# Patient Record
Sex: MALE | Race: WHITE | NOT HISPANIC OR LATINO | Employment: OTHER | URBAN - METROPOLITAN AREA
[De-identification: names, ages, dates, MRNs, and addresses within clinical notes are randomized per-mention and may not be internally consistent; named-entity substitution may affect disease eponyms.]

---

## 2019-03-06 ENCOUNTER — HOSPITAL ENCOUNTER (EMERGENCY)
Facility: HOSPITAL | Age: 59
Discharge: HOME | End: 2019-03-06
Attending: EMERGENCY MEDICINE
Payer: OTHER MISCELLANEOUS

## 2019-03-06 VITALS
HEART RATE: 84 BPM | SYSTOLIC BLOOD PRESSURE: 136 MMHG | OXYGEN SATURATION: 96 % | WEIGHT: 214.31 LBS | DIASTOLIC BLOOD PRESSURE: 74 MMHG | TEMPERATURE: 98 F | RESPIRATION RATE: 18 BRPM
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
## 2019-03-06 DIAGNOSIS — S42.202A CLOSED FRACTURE OF PROXIMAL END OF LEFT HUMERUS, UNSPECIFIED FRACTURE MORPHOLOGY, INITIAL ENCOUNTER: Primary | ICD-10-CM

## 2019-03-06 PROCEDURE — 99283 EMERGENCY DEPT VISIT LOW MDM: CPT

## 2019-03-06 RX ORDER — HYDROCODONE BITARTRATE AND ACETAMINOPHEN 7.5; 325 MG/1; MG/1
1 TABLET ORAL EVERY 6 HOURS PRN
Qty: 12 TABLET | Refills: 0 | Status: SHIPPED | OUTPATIENT
Start: 2019-03-06 | End: 2019-05-23

## 2019-03-06 NOTE — ED PROVIDER NOTES
SCRIBE #1 NOTE: I, Yanique Bond, am scribing for, and in the presence of, Sloan Moran NP. I have scribed the entire note.       History     Chief Complaint   Patient presents with    Arm Injury     pt fell today and landed on left arm. Pt reports pain to left shoulder and upper arm. Hx of surgery on arm      Review of patient's allergies indicates:  No Known Allergies      History of Present Illness     HPI    3/6/2019, 4:39 PM  History obtained from the patient      History of Present Illness: Cruz Tran is a 59 y.o. male patient with no pertinent PMHx who presents to the Emergency Department for evaluation of upper L arm pain which onset suddenly after the pt tripped and landed on his arm. Symptoms are constant and moderate in severity. No mitigating or exacerbating factors reported. Associated sxs include L shoulder pain. Patient denies any fever, chills, N/V/D, back pain, neck pain, joint swelling, and all other sxs at this time. No prior tx reported. No further complaints or concerns at this time.         Arrival mode: Personal vehicle      PCP: Jovanny Barboza MD        Past Medical History:  Past Medical History:   Diagnosis Date    Hypertension        Past Surgical History:  Past Surgical History:   Procedure Laterality Date    arm surgery      HERNIA REPAIR         Family History:  Family history reviewed not relevant      Social History:  Social History     Tobacco Use    Smoking status: Current Every Day Smoker    Smokeless tobacco: Never Used   Substance and Sexual Activity    Alcohol use: Yes     Frequency: Never    Drug use: Unknown     Sexual activity: Unknown         Review of Systems     Review of Systems   Constitutional: Negative for chills and fever.   HENT: Negative for sore throat.    Respiratory: Negative for shortness of breath.    Cardiovascular: Negative for chest pain.   Gastrointestinal: Negative for diarrhea, nausea and vomiting.   Genitourinary: Negative for  dysuria.   Musculoskeletal: Negative for back pain, joint swelling and neck pain.        (+) L shoulder pain   (+) L upper arm pain    Skin: Negative for rash.   Neurological: Negative for weakness.   Hematological: Does not bruise/bleed easily.   All other systems reviewed and are negative.     Physical Exam     Initial Vitals [03/06/19 1618]   BP Pulse Resp Temp SpO2   136/74 84 18 98.1 °F (36.7 °C) 96 %      MAP       --          Physical Exam  Nursing Notes and Vital Signs Reviewed.  Constitutional: Patient is in no acute distress. Well-developed and well-nourished.  Head: Atraumatic. Normocephalic.  Eyes: PERRL. EOM intact. Conjunctivae are not pale. No scleral icterus.  ENT: Mucous membranes are moist. Oropharynx is clear and symmetric.    Neck: Supple. Full ROM. No lymphadenopathy.  Cardiovascular: Regular rate. Regular rhythm. No murmurs, rubs, or gallops. Distal pulses are 2+ and symmetric.  Pulmonary/Chest: No respiratory distress. Clear to auscultation bilaterally. No wheezing or rales.  Abdominal: Soft and non-distended.  There is no tenderness.  No rebound, guarding, or rigidity. Good bowel sounds.  Genitourinary: No CVA tenderness  Musculoskeletal: Moves all extremities. No obvious deformities. No edema. No calf tenderness.  LUE: has no evident deformity. Negative for swelling. L proximal upper extremity TTP. ROM is limited secondary to pain. Cap refill distally is <2 seconds. Radial pulses are equal and 2+ bilaterally. No motor deficit. No distal sensory deficit. NVI distally.   Skin: Warm and dry.  Neurological:  Alert, awake, and appropriate.  Normal speech.  No acute focal neurological deficits are appreciated.  Psychiatric: Normal affect. Good eye contact. Appropriate in content.     ED Course   Orthopedic Injury  Date/Time: 3/6/2019 4:46 PM  Performed by: Sloan Moran NP  Authorized by: Sloan Mroan NP     Injury:     Injury location:  Upper arm    Location details:  Left upper arm     Injury type:  Fracture      Pre-procedure assessment:     Neurovascular status: Neurovascularly intact      Distal perfusion: normal      Neurological function: normal      Range of motion: reduced      Local anesthesia used?: No      Patient sedated?: No        Selections made in this section will also lock the Injury type section above.:     Immobilization:  Sling    Complications: No      Specimens: No      Implants: No    Post-procedure assessment:     Neurovascular status: Neurovascularly intact      Distal perfusion: normal      Neurological function: normal      Range of motion: unchanged      Patient tolerance:  Patient tolerated the procedure well with no immediate complications      ED Vital Signs:  Vitals:    03/06/19 1618   BP: 136/74   Pulse: 84   Resp: 18   Temp: 98.1 °F (36.7 °C)   TempSrc: Oral   SpO2: 96%   Weight: 97.2 kg (214 lb 4.6 oz)         Imaging Results:  Imaging Results          X-Ray Shoulder 2 or More Views Left (In process)                X-Ray Humerus 2 View Left (Final result)  Result time 03/06/19 16:31:19    Final result by Myron Rubio III, MD (03/06/19 16:31:19)                 Impression:      New acute fracture of the proximal left humerus, as above.  Chronic healed internally fixated fracture of the mid humeral diaphysis.      Electronically signed by: Myron Rubio MD  Date:    03/06/2019  Time:    16:31             Narrative:    EXAMINATION:  XR HUMERUS 2 VIEW LEFT    CLINICAL HISTORY:  Unspecified fall, initial encounter    FINDINGS:  Comparison is made to 11/24/2009.  There is a chronic healed internally fixated fracture of the mid humeral diaphysis without evidence of hardware failure or other acute complication.  There is a new acute longitudinal to oblique fracture of the greater tuberosity extending into the superolateral humeral head and lateral humeral diaphysis.  There is a suspected impacted fracture of the humeral neck.  No other fracture is identified.  Joint  alignment is anatomic.                                      The Emergency Provider reviewed the vital signs and test results, which are outlined above.     ED Discussion     4:44 PM: Primary exam described above. Discussed with pt all pertinent ED information and results. Discussed pt plan of tx. Gave pt all f/u and return to the ED instructions. All questions and concerns were addressed at this time. Pt expresses understanding of information and instructions, and is comfortable with plan to discharge. Pt is stable for discharge.    I discussed with patient and/or family/caretaker that evaluation in the ED does not suggest any emergent or life threatening medical conditions requiring immediate intervention beyond what was provided in the ED, and I believe patient is safe for discharge.  Regardless, an unremarkable evaluation in the ED does not preclude the development or presence of a serious of life threatening condition. As such, patient was instructed to return immediately for any worsening or change in current symptoms.    Driving or other activities under influence of medications - Patient and/or family/caretaker was given a prescription for, or instructed to use a medicine that may impair ability to drive, operate machinery, or participate in other potentially dangerous activities.  Patient was instructed not to participate in these activities while under the influence of these medications.      ED Medication(s):  Medications - No data to display    Current Discharge Medication List      START taking these medications    Details   HYDROcodone-acetaminophen (NORCO) 7.5-325 mg per tablet Take 1 tablet by mouth every 6 (six) hours as needed for Pain.  Qty: 12 tablet, Refills: 0               Follow-up Information     [facility]. Schedule an appointment as soon as possible for a visit in 2 days.    Contact information:  [phone]                       Medical Decision Making:   Clinical Tests:    Radiological Study: Ordered and Reviewed             Scribe Attestation:   Scribe #1: I performed the above scribed service and the documentation accurately describes the services I performed. I attest to the accuracy of the note.     Attending:   Physician Attestation Statement for Scribe #1: I, Sloan Moran NP, personally performed the services described in this documentation, as scribed by Yanique Bond, in my presence, and it is both accurate and complete.           Clinical Impression       ICD-10-CM ICD-9-CM   1. Closed fracture of proximal end of left humerus, unspecified fracture morphology, initial encounter S42.202A 812.00   2. Fall W19.XXXA E888.9       Disposition:   Disposition: Discharged  Condition: Stable         Sloan Moran NP  03/06/19 4136

## 2019-03-08 ENCOUNTER — OFFICE VISIT (OUTPATIENT)
Dept: ORTHOPEDICS | Facility: CLINIC | Age: 59
End: 2019-03-08
Payer: OTHER MISCELLANEOUS

## 2019-03-08 VITALS
SYSTOLIC BLOOD PRESSURE: 127 MMHG | WEIGHT: 214 LBS | HEIGHT: 71 IN | DIASTOLIC BLOOD PRESSURE: 86 MMHG | BODY MASS INDEX: 29.96 KG/M2 | HEART RATE: 87 BPM

## 2019-03-08 DIAGNOSIS — S42.292A OTHER CLOSED DISPLACED FRACTURE OF PROXIMAL END OF LEFT HUMERUS, INITIAL ENCOUNTER: ICD-10-CM

## 2019-03-08 DIAGNOSIS — M25.512 ACUTE PAIN OF LEFT SHOULDER: ICD-10-CM

## 2019-03-08 DIAGNOSIS — R52 PAIN: Primary | ICD-10-CM

## 2019-03-08 DIAGNOSIS — S42.392S OTHER CLOSED FRACTURE OF SHAFT OF LEFT HUMERUS, SEQUELA: ICD-10-CM

## 2019-03-08 PROCEDURE — 99999 PR PBB SHADOW E&M-EST. PATIENT-LVL III: ICD-10-PCS | Mod: PBBFAC,,, | Performed by: ORTHOPAEDIC SURGERY

## 2019-03-08 PROCEDURE — 99204 PR OFFICE/OUTPT VISIT, NEW, LEVL IV, 45-59 MIN: ICD-10-PCS | Mod: S$GLB,,, | Performed by: ORTHOPAEDIC SURGERY

## 2019-03-08 PROCEDURE — 99999 PR PBB SHADOW E&M-EST. PATIENT-LVL III: CPT | Mod: PBBFAC,,, | Performed by: ORTHOPAEDIC SURGERY

## 2019-03-08 PROCEDURE — 99204 OFFICE O/P NEW MOD 45 MIN: CPT | Mod: S$GLB,,, | Performed by: ORTHOPAEDIC SURGERY

## 2019-03-08 NOTE — PROGRESS NOTES
Subjective:     Patient ID: Cruz Tran is a 59 y.o. male.    Chief Complaint: Pain of the Left Shoulder    He had a fall onto his left shoulder 2 days ago, impacted his left shoulder immediate pain. He had a prior left humerus fracture here that was fix, underwent open reduction internal fixation by a surgeon years ago.  No issues until his recent fall.  His pain is more proximal the shoulder that was before.  He is in a sling today. He works for he works for [employer].  This is a worker's Comp claim.      Shoulder Injury    The pain is present in the left shoulder. This is a new problem. The current episode started in the past 7 days (wednesday 3/6). There has been a history of trauma. The injury was the result of a pulling and falling (pt states he was pulling pallet eleno and he tripped over another pallet behind him and fell in a parking lot on his shoulder) action while at work. The problem occurs constantly. The problem has been gradually worsening. The quality of the pain is described as aching, sharp and shooting. The pain is at a severity of 8/10. Pertinent negatives include no fever or itching. The symptoms are aggravated by activity. He has tried oral narcotics for the symptoms. Physical therapy was not tried.      Past Medical History:   Diagnosis Date    Hypertension      Past Surgical History:   Procedure Laterality Date    arm surgery      HERNIA REPAIR       History reviewed. No pertinent family history.  Social History     Socioeconomic History    Marital status:      Spouse name: Not on file    Number of children: Not on file    Years of education: Not on file    Highest education level: Not on file   Social Needs    Financial resource strain: Not on file    Food insecurity - worry: Not on file    Food insecurity - inability: Not on file    Transportation needs - medical: Not on file    Transportation needs - non-medical: Not on file   Occupational History    Not on  file   Tobacco Use    Smoking status: Current Every Day Smoker    Smokeless tobacco: Never Used   Substance and Sexual Activity    Alcohol use: Yes     Frequency: Never    Drug use: Not on file    Sexual activity: Not on file   Other Topics Concern    Not on file   Social History Narrative    Not on file     Medication List with Changes/Refills   Current Medications    HYDROCODONE-ACETAMINOPHEN (NORCO) 7.5-325 MG PER TABLET    Take 1 tablet by mouth every 6 (six) hours as needed for Pain.     Review of patient's allergies indicates:  No Known Allergies  Review of Systems   Constitution: Negative for fever.   HENT: Negative for sore throat.    Eyes: Negative for blurred vision.   Cardiovascular: Negative for dyspnea on exertion.   Respiratory: Negative for shortness of breath.    Hematologic/Lymphatic: Does not bruise/bleed easily.   Skin: Negative for itching.   Gastrointestinal: Negative for vomiting.   Genitourinary: Negative for dysuria.   Neurological: Negative for dizziness.   Psychiatric/Behavioral: The patient does not have insomnia.        Objective:   Body mass index is 29.85 kg/m².  Vitals:    03/08/19 0759   BP: 127/86   Pulse: 87           General    Nursing note and vitals reviewed.  Constitutional: He is oriented to person, place, and time. He appears well-developed. No distress.   HENT:   Head: Normocephalic and atraumatic.   Eyes: EOM are normal.   Cardiovascular: Normal rate.    Pulmonary/Chest: Effort normal. No stridor.   Neurological: He is alert and oriented to person, place, and time.   Psychiatric: He has a normal mood and affect. His behavior is normal.             Left Shoulder Exam     Inspection/Observation   Scars: present    Tenderness   The patient is tender to palpation of the greater tuberosity.    Other   Sensation: normal     Comments:  Skin intact, sensation light touch intact radial ulnar and median nerve distributions, intact anterior interosseous nerve, posterior  interosseous nerve, radial nerve function, reports history of prior wrist injury, full range of motion the shoulder is not tested.      Vascular Exam       Capillary Refill  Left Hand: normal capillary refill      IMAGING images reviewed demonstrating minimally displaced greater tuberosity fracture with impaction of the shaft and neck into the humeral head, I do not see any obvious intra-articular split but there may be extension to the articular surface. Prior mid shaft healed fracture is seen with plate and screw construct, the appears to be a lag screw with no obvious lucent fracture line at this time.    Assessment:     Encounter Diagnoses   Name Primary?    Pain Yes    Other closed displaced fracture of proximal end of left humerus, initial encounter     Acute pain of left shoulder     Other closed fracture of shaft of left humerus, sequela         Plan:     I had an in depth discussion today with Cruz today regarding his left shoulder problem, going over his radiographs and the model to help further his understanding. I explained the anatomy and pathophysiology of the problem. I told Cruz  that I believe the problem relates to what appears to be mildly displaced proximal humerus fracture, there is impaction though and possible intra-articular extension, I do want to quantify the intra-articular extension and any intra-articular step-off, modify the greater tuberosity fracture displacement. . We had an in depth discussion regarding appropriate treatment and management of his condition.     From a treatment standpoint, the decision was made to go forward with :     Recommend CT scan left shoulder to evaluate proximal humerus fracture, possible surgical planning versus decision making for non operative treatment.  He will follow up 1 day after this further recommendations.  I do think this is necessary for proper care of his left shoulder.

## 2019-03-14 ENCOUNTER — HOSPITAL ENCOUNTER (OUTPATIENT)
Dept: RADIOLOGY | Facility: HOSPITAL | Age: 59
Discharge: HOME | End: 2019-03-14
Attending: ORTHOPAEDIC SURGERY
Payer: OTHER MISCELLANEOUS

## 2019-03-14 DIAGNOSIS — R52 PAIN: ICD-10-CM

## 2019-03-14 PROCEDURE — 73200 CT UPPER EXTREMITY W/O DYE: CPT | Mod: TC,LT

## 2019-03-25 ENCOUNTER — TELEPHONE (OUTPATIENT)
Dept: ORTHOPEDICS | Facility: CLINIC | Age: 59
End: 2019-03-25

## 2019-03-25 NOTE — TELEPHONE ENCOUNTER
Spoke with patient in reference to CT scan review. Patient is requesting an appointment at the [facility] location. This nurse spoke with the patient and informed him that there are no surgeons at the [facility] location. Patient accepted appointment for 3/28/19 at 0720 at the [facility].//DG          ----- Message from Kathrine Mejia sent at 3/25/2019  1:24 PM CDT -----  Contact: pt  Type:  Test Results    Who Called: pt   Name of Test (Lab/Mammo/Etc): x-ray   Date of Test: 03/14  Ordering Provider:  Dr Banks  Where the test was performed: Hospital  Would the patient rather a call back or a response via [facility]? Call [facility]  Best Call Back Number: [phone]  Additional Information:  Pt stated know one has called about his results

## 2019-04-30 ENCOUNTER — TELEPHONE (OUTPATIENT)
Dept: ORTHOPEDICS | Facility: CLINIC | Age: 59
End: 2019-04-30

## 2019-04-30 NOTE — TELEPHONE ENCOUNTER
Called pt to reschedule his apt to 5/23 at [facility]. Pt understood.       ----- Message from Yelitza Major sent at 4/30/2019  9:33 AM CDT -----  Contact: Patient   Patient requesting a call back to see what are the next steps. Please call back at [phone].      Thanks,  Yelitza Major

## 2019-05-23 ENCOUNTER — APPOINTMENT (OUTPATIENT)
Dept: RADIOLOGY | Facility: HOSPITAL | Age: 59
End: 2019-05-23
Attending: ORTHOPAEDIC SURGERY
Payer: OTHER MISCELLANEOUS

## 2019-05-23 ENCOUNTER — OFFICE VISIT (OUTPATIENT)
Dept: ORTHOPEDICS | Facility: CLINIC | Age: 59
End: 2019-05-23
Payer: OTHER MISCELLANEOUS

## 2019-05-23 VITALS
BODY MASS INDEX: 29.96 KG/M2 | SYSTOLIC BLOOD PRESSURE: 129 MMHG | DIASTOLIC BLOOD PRESSURE: 84 MMHG | RESPIRATION RATE: 18 BRPM | HEIGHT: 71 IN | WEIGHT: 214 LBS | HEART RATE: 74 BPM

## 2019-05-23 DIAGNOSIS — S42.292D OTHER CLOSED DISPLACED FRACTURE OF PROXIMAL END OF LEFT HUMERUS WITH ROUTINE HEALING, SUBSEQUENT ENCOUNTER: Primary | ICD-10-CM

## 2019-05-23 DIAGNOSIS — M25.512 ACUTE PAIN OF LEFT SHOULDER: Primary | ICD-10-CM

## 2019-05-23 DIAGNOSIS — M25.512 ACUTE PAIN OF LEFT SHOULDER: ICD-10-CM

## 2019-05-23 PROCEDURE — 99214 PR OFFICE/OUTPT VISIT, EST, LEVL IV, 30-39 MIN: ICD-10-PCS | Mod: S$GLB,,, | Performed by: ORTHOPAEDIC SURGERY

## 2019-05-23 PROCEDURE — 73030 X-RAY EXAM OF SHOULDER: CPT | Mod: TC,PO,LT

## 2019-05-23 PROCEDURE — 99214 OFFICE O/P EST MOD 30 MIN: CPT | Mod: S$GLB,,, | Performed by: ORTHOPAEDIC SURGERY

## 2019-05-23 PROCEDURE — 99999 PR PBB SHADOW E&M-EST. PATIENT-LVL IV: ICD-10-PCS | Mod: PBBFAC,,, | Performed by: ORTHOPAEDIC SURGERY

## 2019-05-23 PROCEDURE — 73030 X-RAY EXAM OF SHOULDER: CPT | Mod: 26,LT,, | Performed by: RADIOLOGY

## 2019-05-23 PROCEDURE — 99999 PR PBB SHADOW E&M-EST. PATIENT-LVL IV: CPT | Mod: PBBFAC,,, | Performed by: ORTHOPAEDIC SURGERY

## 2019-05-23 PROCEDURE — 73030 XR SHOULDER COMPLETE 2 OR MORE VIEWS LEFT: ICD-10-PCS | Mod: 26,LT,, | Performed by: RADIOLOGY

## 2019-05-23 RX ORDER — LISINOPRIL 10 MG/1
10 TABLET ORAL
COMMUNITY
Start: 2019-03-15 | End: 2020-03-14

## 2019-05-23 RX ORDER — AMLODIPINE BESYLATE 10 MG/1
10 TABLET ORAL
COMMUNITY
Start: 2019-03-15

## 2019-05-23 RX ORDER — ASPIRIN 81 MG/1
81 TABLET ORAL
COMMUNITY

## 2019-05-23 NOTE — PROGRESS NOTES
Subjective:     Patient ID: Cruz Tran is a 59 y.o. male.    Chief Complaint: Pain of the Left Shoulder    5/23/19: he had CT 3/14/19 and states that no one from our office ever contacted him for follow up. He is here today, has been using shoulder for ROM, protecting it. Pain is 1/10, much better. Has not started any physical therapy. Lives in .    3/7/19: He had a fall onto his left shoulder 2 days ago, impacted his left shoulder immediate pain. He had a prior left humerus fracture here that was fix, underwent open reduction internal fixation by a surgeon years ago.  No issues until his recent fall.  His pain is more proximal the shoulder that was before.  He is in a sling today. He works for he works for [employer].  This is a worker's Comp claim.    Shoulder Injury    The pain is present in the left shoulder. The current episode started more than 1 month ago (wednesday 3/6). There has been a history of trauma. The injury was the result of a pulling and falling (pt states he was pulling pallet [place] and he tripped over another pallet behind him and fell in a parking lot on his shoulder) action while at work. The problem occurs constantly. The problem has been gradually improving. The quality of the pain is described as aching, sharp and shooting. The pain is at a severity of 1/10. Pertinent negatives include no fever or itching. The symptoms are aggravated by activity. He has tried oral narcotics for the symptoms. Physical therapy was not tried.      Past Medical History:   Diagnosis Date    Hypertension      Past Surgical History:   Procedure Laterality Date    arm surgery      HERNIA REPAIR       History reviewed. No pertinent family history.  Social History     Socioeconomic History    Marital status:      Spouse name: Not on file    Number of children: Not on file    Years of education: Not on file    Highest education level: Not on file   Occupational History    Not on file    Social Needs    Financial resource strain: Not on file    Food insecurity:     Worry: Not on file     Inability: Not on file    Transportation needs:     Medical: Not on file     Non-medical: Not on file   Tobacco Use    Smoking status: Current Every Day Smoker    Smokeless tobacco: Never Used   Substance and Sexual Activity    Alcohol use: Yes     Frequency: Never    Drug use: Not on file    Sexual activity: Not on file   Lifestyle    Physical activity:     Days per week: Not on file     Minutes per session: Not on file    Stress: Not on file   Relationships    Social connections:     Talks on phone: Not on file     Gets together: Not on file     Attends Congregation service: Not on file     Active member of club or organization: Not on file     Attends meetings of clubs or organizations: Not on file     Relationship status: Not on file   Other Topics Concern    Not on file   Social History Narrative    Not on file     Medication List with Changes/Refills   Current Medications    AMLODIPINE (NORVASC) 10 MG TABLET    Take 10 mg by mouth.    ASPIRIN (ECOTRIN) 81 MG EC TABLET    Take 81 mg by mouth.    LISINOPRIL 10 MG TABLET    Take 10 mg by mouth.   Discontinued Medications    HYDROCODONE-ACETAMINOPHEN (NORCO) 7.5-325 MG PER TABLET    Take 1 tablet by mouth every 6 (six) hours as needed for Pain.     Review of patient's allergies indicates:  No Known Allergies  Review of Systems   Constitution: Negative for fever.   HENT: Negative for sore throat.    Eyes: Negative for blurred vision.   Cardiovascular: Negative for dyspnea on exertion.   Respiratory: Negative for shortness of breath.    Hematologic/Lymphatic: Does not bruise/bleed easily.   Skin: Negative for itching.   Musculoskeletal: Positive for joint pain and joint swelling.   Gastrointestinal: Negative for vomiting.   Genitourinary: Negative for dysuria.   Neurological: Negative for dizziness.   Psychiatric/Behavioral: The patient does not have  insomnia.        Objective:   Body mass index is 29.85 kg/m².  Vitals:    05/23/19 0854   BP: 129/84   Pulse: 74   Resp: 18           General    Nursing note and vitals reviewed.  Constitutional: He is oriented to person, place, and time. He appears well-developed. No distress.   HENT:   Head: Normocephalic and atraumatic.   Eyes: EOM are normal.   Cardiovascular: Normal rate.    Pulmonary/Chest: Effort normal. No stridor.   Neurological: He is alert and oriented to person, place, and time.   Psychiatric: He has a normal mood and affect. His behavior is normal.             Left Shoulder Exam     Inspection/Observation   Scars: present    Other   Sensation: normal     Comments:  Skin intact, sensation light touch intact radial ulnar and median nerve distributions, intact anterior interosseous nerve, posterior interosseous nerve, radial nerve function, reports history of prior wrist injury,     AROM 90 FF  50 ABD  20 ER  IR sacrum      Vascular Exam       Capillary Refill  Left Hand: normal capillary refill      Radiographs / Imaging : Results reviewed by me and interpreted by me, discussed with the patient and / or family     CT reviewed, overall same alignment as plain views except it does better show 2 mm step off lateral head almost toward edge of articular surface    Radiographs today show no interval displacement    Assessment:     Encounter Diagnosis   Name Primary?    Other closed displaced fracture of proximal end of left humerus with routine healing, subsequent encounter Yes        Plan:     Start PT for L shoulder ROM, ROM first, then can transition to rotator cuff strengthening  Would like to go to [place]  No more than 5 lbs lifting, no repetitive lifting - can re eval next visit  F/u 6 weeks for new radiographs and clinical check

## 2019-05-30 ENCOUNTER — CLINICAL SUPPORT (OUTPATIENT)
Dept: REHABILITATION | Facility: HOSPITAL | Age: 59
End: 2019-05-30
Attending: ORTHOPAEDIC SURGERY
Payer: OTHER MISCELLANEOUS

## 2019-05-30 DIAGNOSIS — S42.292D OTHER CLOSED DISPLACED FRACTURE OF PROXIMAL END OF LEFT HUMERUS WITH ROUTINE HEALING, SUBSEQUENT ENCOUNTER: Primary | ICD-10-CM

## 2019-05-30 PROCEDURE — 97110 THERAPEUTIC EXERCISES: CPT

## 2019-05-30 PROCEDURE — 97161 PT EVAL LOW COMPLEX 20 MIN: CPT

## 2019-05-30 NOTE — PROGRESS NOTES
[facility]  Physical Therapy Initial Evaluation    Name: Cruz Tran  [facility] Number: [id number]    Therapy Diagnosis:   Encounter Diagnosis   Name Primary?    Other closed displaced fracture of proximal end of left humerus with routine healing, subsequent encounter Yes     Physician: Reji Banks MD    Physician Orders: PT Eval and Treat   Medical Diagnosis from Referral: closed displaced fx of proximal end of left humerus with routine healing, subsequent encounter  Evaluation Date: 5/30/2019  Authorization Period Expiration: 8/29/2019  Plan of Care Expiration: 7/29/2019  Visit # / Visits authorized: 1/ 12    Time In: 2:00pm  Time Out: 2:50pm  Total Billable Time: 50 minutes    Precautions: Standard    Subjective   Date of onset: 3/6/2019  History of current condition - CHASE reports: s/p fx of the left humerus while at work. He is currently working but performing light work only. He has worn a sling since 3/6/2019. He is not allowed to lift greater than 5#.     Pain of the Left Shoulder     5/23/19: he had CT 3/14/19 and states that no one from our office ever contacted him for follow up. He is here today, has been using shoulder for ROM, protecting it. Pain is 1/10, much better. Has not started any physical therapy. Lives in .     3/7/19: He had a fall onto his left shoulder 2 days ago, impacted his left shoulder immediate pain. He had a prior left humerus fracture here that was fix, underwent open reduction internal fixation by a surgeon years ago.  No issues until his recent fall.  His pain is more proximal the shoulder that was before.  He is in a sling today. He works for he works for [employer].  This is a worker's Comp claim.     Pain:  Current 0/10, worst 7/10, best 0/10   Location: left shoulder   Description: Tight and feels like something is  in the shoulder, sharp pain at times  Aggravating Factors: reaching above head and out to side and  behind  Easing Factors: rest    Prior Therapy: none  Social History:  lives with their spouse  Occupation: assistive manager of [employer]  Prior Level of Function: Independent   Current Level of Function: working with a sling with pain in left shoulder    Imaging, CT scan films: 5/23/2019    Medical History:   Past Medical History:   Diagnosis Date    Hypertension        Surgical History:   Cruz Tran  has a past surgical history that includes arm surgery and Hernia repair.    Medications:   Cruz has a current medication list which includes the following prescription(s): amlodipine, aspirin, and lisinopril.    Allergies:   Review of patient's allergies indicates:  No Known Allergies     Pts goals: reach above head, work without pain, play golf    Objective              Posture: Pt noted to present with forward head/rounded shoulder posture.    Scapular AROM standing:     Shoulder ROM:   Active/Passive Joint Range LEFT RIGHT   Flexion 113/119  wnl   ABDuction 60/60 wnl   External Rotation 15/15 wnl   Internal Rotation 55/57 wnl   Extension wnl wnl     Cervical Spine AROM:   % limited Pain   FB 0 n   BB 0 n   RSB 50 n   LSB 50 n   RR 0 n   LR 0 n     Strength:  Muscle (Myotome) Right Left   Shoulder Flex 5 3+   Shoulder Abduction 5 2+   Elbow Flexors (C5) 5 5   Wrist Extensors (C6) 5 4+   Elbow Extensors (C7) 5 5     Sensation: Intact/Demined/Absent  Reflexes: Intact/Diminished/Absent.    Special Test:  Nash -  Neers  -       Joint Mobility: Scapular mobility wnl    Function: Patient reports 36% disability based on score of the Upper Extremity Functional Scale.    Tenderness to palpation:  Patient tender to palpate along anterior shoulder with moderate depth of palpation.    TREATMENT   Treatment Time In: 2:30pm  Treatment Time Out: 2:50pm  Total Treatment time separate from Evaluation: 20 minutes    CHASE received therapeutic exercises to develop ROM for 20 minutes including:  (Left  shoulder)  -tabletop shoulder flexion, abduction, ER  -towel stretch to increase shoulder IR  -supine shoulder flexion and abduction with a dowel x 10  -pendulum  -pec stretch at corner    Home Exercises and Patient Education Provided    Education provided:   -Education on condition, HEP to increase ROM left shoulder    Written Home Exercises Provided: yes.  Exercises were reviewed and CHASE was able to demonstrate them prior to the end of the session.  CHASE demonstrated good  understanding of the education provided.         Assessment   Cruz is a 59 y.o. male referred to outpatient Physical Therapy with a medical diagnosis of closed displaced fx of proximal humerus. Pt presents with impaired left shoulder ROM and strength and has pain at end range of shoulder motions. He wears a sling during the day for protection but is allowed to remove it when he wants to. He is currently working and doing light duty at work but wants to return to his prior duties which includes lifting heavy objects.    Pt prognosis is Excellent.   Pt will benefit from skilled outpatient Physical Therapy to address the deficits stated above and in the chart below, provide pt/family education, and to maximize pt's level of independence.     Plan of care discussed with patient: Yes  Pt's spiritual, cultural and educational needs considered and patient is agreeable to the plan of care and goals as stated below:     Anticipated Barriers for therapy: none anticipated    Medical Necessity is demonstrated by the following  History  Co-morbidities and personal factors that may impact the plan of care Co-morbidities:   none    Personal Factors:   no deficits     low   Examination  Body Structures and Functions, activity limitations and participation restrictions that may impact the plan of care Body Regions:   upper extremities    Body Systems:    ROM  strength    Participation Restrictions:   none    Activity limitations:   Learning and applying  knowledge  no deficits    General Tasks and Commands  no deficits    Communication  no deficits    Mobility  lifting and carrying objects    Self care  no deficits    Domestic Life  doing house work (cleaning house, washing dishes, laundry)  assisting others    Interactions/Relationships  no deficits    Life Areas  no deficits    Community and Social Life  no deficits         low   Clinical Presentation stable and uncomplicated low   Decision Making/ Complexity Score: low     Goals:  Short Term Goals: In 4 weeks   1.I with HEP  2.Patient to increase GH ROM of left shoulder flexion, abduction and ER by 30 degrees and left IR by 20 degrees    3.Patient to increase MMT strength of left abduction to 3-/5    4.Patient to have pain less than 4/10 during activity  5.Patient to score less than 20% impaired on the QUICK DASH    Long Term Goals: In 10 weeks  1. Patient to score less than 5% impaired on the QUICK DASH  2. Patient to demo increase in UE strength to 5/5 throughout shoulder joint  3. Patient to have decreased pain to 2/10 or less during activity  4. Patient to demo increase GH ROM to WNLs all planes  5. Patient to perform daily activities including lifting objects overhead without limitation.      Plan   Plan of care Certification: 5/30/2019 to 7/29/2019    Outpatient Physical Therapy 2 times weekly for 10 weeks to include the following interventions: Electrical Stimulation IFC, Manual Therapy, Moist Heat/ Ice and Therapeutic Exercise.     Lashonda Steele, PT    Thank you for this referral.    These services are reasonable and necessary for the conditions set forth above while under my care.

## 2019-06-03 ENCOUNTER — CLINICAL SUPPORT (OUTPATIENT)
Dept: REHABILITATION | Facility: HOSPITAL | Age: 59
End: 2019-06-03
Payer: OTHER MISCELLANEOUS

## 2019-06-03 DIAGNOSIS — S42.292D OTHER CLOSED DISPLACED FRACTURE OF PROXIMAL END OF LEFT HUMERUS WITH ROUTINE HEALING, SUBSEQUENT ENCOUNTER: Primary | ICD-10-CM

## 2019-06-03 PROCEDURE — 97110 THERAPEUTIC EXERCISES: CPT

## 2019-06-03 PROCEDURE — 97140 MANUAL THERAPY 1/> REGIONS: CPT

## 2019-06-03 NOTE — PROGRESS NOTES
Physical Therapy Daily Treatment Note     Name: Cruz Tran  [facility] Number: [id number]    Therapy Diagnosis:   Encounter Diagnosis   Name Primary?    Other closed displaced fracture of proximal end of left humerus with routine healing, subsequent encounter Yes     Physician: Reji Banks MD    Visit Date: 6/3/2019    Physician Orders: PT Eval and Treat   Medical Diagnosis from Referral: closed displaced fx of proximal end of left humerus with routine healing, subsequent encounter  Evaluation Date: 5/30/2019  Authorization Period Expiration: 8/29/2019  Plan of Care Expiration: 7/29/2019  Visit # / Visits authorized: 2/ 12    Time In: 0855  Time Out: 0950  Total Billable Time: 55 minutes    Precautions: Standard    Subjective     Pt reports: soreness in left shoulder  He was compliant with home exercise program.  Response to previous treatment: good  Functional change: n/a at this time  -  Pain: 3/10  Location: left shoulder      Objective     CHASE received therapeutic exercises to develop strength and ROM for 45 minutes including:  (Left shoulder)  -tabletop shoulder flexion, abduction, ER  -towel stretch/dowel stretch to increase shoulder IR  -supine shoulder flexion, abduction and ER with a dowel 2x10  -pendulum (reviewed only)  -SL ER 2# 2x10  -SL abduction and flexoin x 10  -sleeper stretch for IR ROM  -Scapular retraction with green t-band x 15  -Shoulder extension with green t-band x 15  -IR with green t-band x 15  - activity + postural correction activity  -pec stretch at corner    CHASE received the following manual therapy techniques: Joint mobilizations were applied to the: left shoulder for 10 minutes, including:  -Anterior, posterior, inferior joint mobs (Grade 3)    Home Exercises Provided and Patient Education Provided     Education provided:   - HEP updated    Written Home Exercises Provided: yes.  Exercises were reviewed and CHASE was able to demonstrate them prior to the end of  the session.  CHASE demonstrated good  understanding of the education provided.       Assessment   Patient continues to have impaired ROM left GH joint. He required gravity eliminated positions to achieve an increase in ROM. He tolerated shoulder stabilization exercises, manual therapy, and ROM exercises without an increase in pain.    CHASE is progressing well towards his goals.   Pt prognosis is Excellent.     Pt will continue to benefit from skilled outpatient physical therapy to address the deficits listed in the problem list box on initial evaluation, provide pt/family education and to maximize pt's level of independence in the home and community environment.     Pt's spiritual, cultural and educational needs considered and pt agreeable to plan of care and goals.     Anticipated barriers to physical therapy: none anticipated    Goals:   Short Term Goals: In 4 weeks   1.I with HEP  2.Patient to increase GH ROM of left shoulder flexion, abduction and ER by 30 degrees and left IR by 20 degrees    3.Patient to increase MMT strength of left abduction to 3-/5    4.Patient to have pain less than 4/10 during activity  5.Patient to score less than 20% impaired on the QUICK DASH     Long Term Goals: In 10 weeks  1. Patient to score less than 5% impaired on the QUICK DASH  2. Patient to demo increase in UE strength to 5/5 throughout shoulder joint  3. Patient to have decreased pain to 2/10 or less during activity  4. Patient to demo increase GH ROM to WNLs all planes  5. Patient to perform daily activities including lifting objects overhead without limitation.        Plan   Plan of care Certification: 5/30/2019 to 7/29/2019     Outpatient Physical Therapy 2 times weekly for 10 weeks to include the following interventions: Electrical Stimulation IFC, Manual Therapy, Moist Heat/ Ice and Therapeutic Exercise.      Lashonda Steele, PT         Lashonda Steele, PT

## 2019-06-10 ENCOUNTER — CLINICAL SUPPORT (OUTPATIENT)
Dept: REHABILITATION | Facility: HOSPITAL | Age: 59
End: 2019-06-10
Attending: ORTHOPAEDIC SURGERY
Payer: OTHER MISCELLANEOUS

## 2019-06-10 DIAGNOSIS — S42.292D OTHER CLOSED DISPLACED FRACTURE OF PROXIMAL END OF LEFT HUMERUS WITH ROUTINE HEALING, SUBSEQUENT ENCOUNTER: Primary | ICD-10-CM

## 2019-06-10 PROCEDURE — 97110 THERAPEUTIC EXERCISES: CPT

## 2019-06-10 NOTE — PROGRESS NOTES
Physical Therapy Daily Treatment Note     Name: Cruz Tran  [facility] Number: [id number]    Therapy Diagnosis:   Encounter Diagnosis   Name Primary?    Other closed displaced fracture of proximal end of left humerus with routine healing, subsequent encounter Yes     Physician: Reji Banks MD    Visit Date: 6/10/2019    Physician Orders: PT Eval and Treat   Medical Diagnosis from Referral: closed displaced fx of proximal end of left humerus with routine healing, subsequent encounter  Evaluation Date: 5/30/2019  Authorization Period Expiration: 8/29/2019  Plan of Care Expiration: 7/29/2019  Visit # / Visits authorized: 3/12    Time In: 1030  Time Out: 1115  Total Billable Time: 45 minutes    Precautions: Standard    Subjective     Pt reports: soreness in left shoulder. He reports using his left arm more which is causing his sorness  He was compliant with home exercise program.  Response to previous treatment: good  Functional change: n/a at this time  -  Pain: 3/10  Location: left shoulder      Objective     CHASE received therapeutic exercises to develop strength and ROM for 45 minutes including:  (Left shoulder)  -wall flexion and abduction slides x 10 for flexion and 3 for abduction  -SL ER 2# 2x10  -sleeper stretch for IR ROM  -Scapular retraction with green t-band x 15  -Shoulder extension with green t-band x 15  -IR with green t-band 3 x 15  - activity with YTB 2x10 (left arm ER only)  -serratus punch with GTB 2x20  -horizontal abduction with YTB x 10  -prone rows with 2# 2x10  -pec stretch at corner    CHASE received the following manual therapy techniques: Joint mobilizations were applied to the: left shoulder for 0 minutes, including:    Home Exercises Provided and Patient Education Provided     Education provided:   - HEP updated    Written Home Exercises Provided: yes.  Exercises were reviewed and CHASE was able to demonstrate them prior to the end of the session.  CHASE demonstrated good   understanding of the education provided.       Assessment   Patient continues to have impaired ROM left GH joint. He is now able to perform some of his exercises against gravity in the sitting position with less difficulty but does not have full motion yet.    CHASE is progressing well towards his goals.   Pt prognosis is Excellent.     Pt will continue to benefit from skilled outpatient physical therapy to address the deficits listed in the problem list box on initial evaluation, provide pt/family education and to maximize pt's level of independence in the home and community environment.     Pt's spiritual, cultural and educational needs considered and pt agreeable to plan of care and goals.     Anticipated barriers to physical therapy: none anticipated    Goals:   Short Term Goals: In 4 weeks   1.I with HEP  2.Patient to increase GH ROM of left shoulder flexion, abduction and ER by 30 degrees and left IR by 20 degrees    3.Patient to increase MMT strength of left abduction to 3-/5    4.Patient to have pain less than 4/10 during activity  5.Patient to score less than 20% impaired on the QUICK DASH     Long Term Goals: In 10 weeks  1. Patient to score less than 5% impaired on the QUICK DASH  2. Patient to demo increase in UE strength to 5/5 throughout shoulder joint  3. Patient to have decreased pain to 2/10 or less during activity  4. Patient to demo increase GH ROM to WNLs all planes  5. Patient to perform daily activities including lifting objects overhead without limitation.        Plan   Plan of care Certification: 5/30/2019 to 7/29/2019     Outpatient Physical Therapy 2 times weekly for 10 weeks to include the following interventions: Electrical Stimulation IFC, Manual Therapy, Moist Heat/ Ice and Therapeutic Exercise.      Lashonda Steele, PT         Lashonda Steele, PT

## 2019-06-12 NOTE — PROGRESS NOTES
Physical Therapy Daily Treatment Note     Name: Cruz Tran  [facility] Number: [id number]    Therapy Diagnosis:   Encounter Diagnosis   Name Primary?    Other closed displaced fracture of proximal end of left humerus with routine healing, subsequent encounter Yes     Physician: Reji Banks MD    Visit Date: 6/13/2019    Physician Orders: PT Eval and Treat   Medical Diagnosis from Referral: closed displaced fx of proximal end of left humerus with routine healing, subsequent encounter  Evaluation Date: 5/30/2019  Authorization Period Expiration: 8/29/2019  Plan of Care Expiration: 7/29/2019  Visit # / Visits authorized: 4/12    Time In: 10:30am  Time Out: 11:40am  Total Billable Time: 70 minutes    Precautions: Standard    Subjective     Pt reports: increase pain in left shoulder. He admits that he is probably overdoing it at work.  He was compliant with home exercise program.  Response to previous treatment: good  Functional change: n/a at this time  -  Pain: 8/10  Location: left shoulder  (decreased to 3/10 at end of session)    Objective     KWADWO received therapeutic exercises to develop strength and ROM for 40 minutes including:  (Left shoulder)  -wall flexion and abduction slides x 10 for flexion and 3 for abduction  -SL ER 2# 3x10  -sleeper stretch for IR ROM/towel stretch for IR ROM  -IR with green t-band 3 x 15  - activity with YTB 2x10 (left arm ER only)  -serratus punch with GTB x50  -horizontal abduction with YTB x 10  -seated ER with YTB 3x10  -prone rows with 3# 3x10  -prone T's x 10  -pec stretch at corner    KWADWO received the following manual therapy techniques: Joint mobilizations and soft tissue massage were applied to the: left shoulder for 15 minutes, including:  -inferior and posteror joint mobs to increase Flexion, Abduction and ER of left shoulder  -Contract-relax techniques to increase Flexion, Abduction and ER/IR      Kwadwo received the following supervised modalities after  being cleared for contradictions: IFC Electrical Stimulation:  Cruz received IFC Electrical Stimulation for pain control applied to the left shoulder. Pt received stimulation at 40 % scan at a frequency of  for 15 minutes. Cruz tolderated treatment well without any adverse effects.      Chase received hot pack for 15 minutes to left shoulder with  IFC.      Home Exercises Provided and Patient Education Provided     Education provided:   - HEP updated    Written Home Exercises Provided: yes.  Exercises were reviewed and CHASE was able to demonstrate them prior to the end of the session.  CHASE demonstrated good  understanding of the education provided.       Assessment   Patient continues to have impaired ROM left GH joint. He had more pain today than usual and responded well to soft tissue massage and joint mobs to improve motion and decrease pain. E-stim was also effective to decrease chronic inflammation at the shoulder joint  Shoulder flexion AROM: 125 degrees. Shoulder abduction AROM: 90 degrees (improvement)   CHASE is progressing well towards his goals.   Pt prognosis is Excellent.     Pt will continue to benefit from skilled outpatient physical therapy to address the deficits listed in the problem list box on initial evaluation, provide pt/family education and to maximize pt's level of independence in the home and community environment.     Pt's spiritual, cultural and educational needs considered and pt agreeable to plan of care and goals.     Anticipated barriers to physical therapy: none anticipated    Goals:   Short Term Goals: In 4 weeks   1.I with HEP  2.Patient to increase GH ROM of left shoulder flexion, abduction and ER by 30 degrees and left IR by 20 degrees    3.Patient to increase MMT strength of left abduction to 3-/5    4.Patient to have pain less than 4/10 during activity  5.Patient to score less than 20% impaired on the QUICK DASH     Long Term Goals: In 10 weeks  1. Patient to score  less than 5% impaired on the QUICK DASH  2. Patient to demo increase in UE strength to 5/5 throughout shoulder joint  3. Patient to have decreased pain to 2/10 or less during activity  4. Patient to demo increase GH ROM to WNLs all planes  5. Patient to perform daily activities including lifting objects overhead without limitation.        Plan   Plan of care Certification: 5/30/2019 to 7/29/2019     Outpatient Physical Therapy 2 times weekly for 10 weeks to include the following interventions: Electrical Stimulation IFC, Manual Therapy, Moist Heat/ Ice and Therapeutic Exercise.      Lashonda Steele, PT         Lashonda Steele, PT

## 2019-06-13 ENCOUNTER — CLINICAL SUPPORT (OUTPATIENT)
Dept: REHABILITATION | Facility: HOSPITAL | Age: 59
End: 2019-06-13
Payer: OTHER MISCELLANEOUS

## 2019-06-13 DIAGNOSIS — S42.292D OTHER CLOSED DISPLACED FRACTURE OF PROXIMAL END OF LEFT HUMERUS WITH ROUTINE HEALING, SUBSEQUENT ENCOUNTER: Primary | ICD-10-CM

## 2019-06-13 PROCEDURE — 97014 ELECTRIC STIMULATION THERAPY: CPT

## 2019-06-13 PROCEDURE — 97150 GROUP THERAPEUTIC PROCEDURES: CPT

## 2019-06-13 PROCEDURE — 97140 MANUAL THERAPY 1/> REGIONS: CPT

## 2019-06-16 NOTE — PROGRESS NOTES
Physical Therapy Daily Treatment Note     Name: Cruz Tran  [facility] Number: [id number]    Therapy Diagnosis:   Encounter Diagnosis   Name Primary?    Other closed displaced fracture of proximal end of left humerus with routine healing, subsequent encounter Yes     Physician: Reji Banks MD    Visit Date: 6/17/2019    Physician Orders: PT Eval and Treat   Medical Diagnosis from Referral: closed displaced fx of proximal end of left humerus with routine healing, subsequent encounter  Evaluation Date: 5/30/2019  Authorization Period Expiration: 8/29/2019  Plan of Care Expiration: 7/29/2019  Visit # / Visits authorized: 5/12    Time In: 10:30am  Time Out: 11:25am  Total Billable Time:  minutes    Precautions: Standard    Subjective     Pt reports: increase pain in left shoulder. He admits that he is probably overdoing it at work.  He was compliant with home exercise program.  Response to previous treatment: good  Functional change: n/a at this time  -  Pain: 8/10 at end-range of shoulder motions  Location: left shoulder  (decreased to 6/10 at end of session)    Objective     KWADWO received therapeutic exercises to develop strength and ROM for 37 minutes including:  (Left shoulder)  -wall flexion and abduction slides x 10 for flexion and 5 for abduction  -SL ER 3# 3x10  -IR with green t-band 3 x 15  -serratus punch with 8# x50  -standing with 1/2 roll: scap retraction with  activity and AAROM shoulder flexion  -prone rows with 3# 3x15  -prone T's x 10  -prone Y x 5  -pec stretch at corner  -IR stretch with towel  -ER stretch at wall    KWADWO received the following manual therapy techniques: Joint mobilizations and soft tissue massage were applied to the: left shoulder for 15 minutes, including:  -inferior and posteror joint mobs to increase Flexion, Abduction and ER of left shoulder  -Contract-relax techniques to increase Flexion, Abduction and ER/IR      Kwadwo received the following supervised  modalities after being cleared for contradictions: IFC Electrical Stimulation:  Cruz received IFC Electrical Stimulation for pain control applied to the left shoulder. Pt received stimulation at 40 % scan at a frequency of  for 8 minutes. Cruz tolderated treatment well without any adverse effects.      Chase received hot pack for 8 minutes to left shoulder with  IFC.      Home Exercises Provided and Patient Education Provided     Education provided:   - HEP updated    Written Home Exercises Provided: yes.  Exercises were reviewed and CHASE was able to demonstrate them prior to the end of the session.  CHASE demonstrated good  understanding of the education provided.       Assessment   Chase has pain in left shoulder at end range of shoulder flexion, abduction, ER/IR(8/10). He reports a decrease in pain at end-range of these motions to 6/10 by the end of the treatment session. ROM of the left shoulder is slowly progressing   CHASE is progressing well towards his goals.   Pt prognosis is Excellent.     Pt will continue to benefit from skilled outpatient physical therapy to address the deficits listed in the problem list box on initial evaluation, provide pt/family education and to maximize pt's level of independence in the home and community environment.     Pt's spiritual, cultural and educational needs considered and pt agreeable to plan of care and goals.     Anticipated barriers to physical therapy: none anticipated    Goals:   Short Term Goals: In 4 weeks   1.I with HEP  2.Patient to increase GH ROM of left shoulder flexion, abduction and ER by 30 degrees and left IR by 20 degrees    3.Patient to increase MMT strength of left abduction to 3-/5    4.Patient to have pain less than 4/10 during activity  5.Patient to score less than 20% impaired on the QUICK DASH     Long Term Goals: In 10 weeks  1. Patient to score less than 5% impaired on the QUICK DASH  2. Patient to demo increase in UE strength to 5/5  throughout shoulder joint  3. Patient to have decreased pain to 2/10 or less during activity  4. Patient to demo increase GH ROM to WNLs all planes  5. Patient to perform daily activities including lifting objects overhead without limitation.        Plan   Plan of care Certification: 5/30/2019 to 7/29/2019     Outpatient Physical Therapy 2 times weekly to include the following interventions: Electrical Stimulation IFC, Manual Therapy, Moist Heat/ Ice and Therapeutic Exercise.     Lashonda Steele, PT

## 2019-06-17 ENCOUNTER — CLINICAL SUPPORT (OUTPATIENT)
Dept: REHABILITATION | Facility: HOSPITAL | Age: 59
End: 2019-06-17
Payer: OTHER MISCELLANEOUS

## 2019-06-17 DIAGNOSIS — S42.292D OTHER CLOSED DISPLACED FRACTURE OF PROXIMAL END OF LEFT HUMERUS WITH ROUTINE HEALING, SUBSEQUENT ENCOUNTER: Primary | ICD-10-CM

## 2019-06-17 PROCEDURE — 97014 ELECTRIC STIMULATION THERAPY: CPT

## 2019-06-17 PROCEDURE — 97110 THERAPEUTIC EXERCISES: CPT

## 2019-06-17 PROCEDURE — 97140 MANUAL THERAPY 1/> REGIONS: CPT

## 2019-06-20 ENCOUNTER — CLINICAL SUPPORT (OUTPATIENT)
Dept: REHABILITATION | Facility: HOSPITAL | Age: 59
End: 2019-06-20
Payer: OTHER MISCELLANEOUS

## 2019-06-20 DIAGNOSIS — S42.292D OTHER CLOSED DISPLACED FRACTURE OF PROXIMAL END OF LEFT HUMERUS WITH ROUTINE HEALING, SUBSEQUENT ENCOUNTER: Primary | ICD-10-CM

## 2019-06-20 PROCEDURE — 97110 THERAPEUTIC EXERCISES: CPT

## 2019-06-20 PROCEDURE — 97140 MANUAL THERAPY 1/> REGIONS: CPT

## 2019-06-20 NOTE — PROGRESS NOTES
Physical Therapy Daily Treatment Note     Name: Cruz Tran  [facility] Number: [id number]    Therapy Diagnosis:   Encounter Diagnosis   Name Primary?    Other closed displaced fracture of proximal end of left humerus with routine healing, subsequent encounter Yes     Physician: Reji Banks MD    Visit Date: 6/20/2019    Physician Orders: PT Eval and Treat   Medical Diagnosis from Referral: closed displaced fx of proximal end of left humerus with routine healing, subsequent encounter  Evaluation Date: 5/30/2019  Authorization Period Expiration: 8/29/2019  Plan of Care Expiration: 7/29/2019  Visit # / Visits authorized: 6/12    Time In: 10:35am  Time Out: 11:30  Total Billable Time:  55 minutes    Precautions: Standard    Subjective     Pt reports: overworking his left arm at work 2 days ago but resting it yesterday.  He was compliant with home exercise program.  Response to previous treatment: good  Functional change: n/a at this time  -  Pain: 7/10 at end-range of shoulder motions  Location: left shoulder     Objective     KWADWO received therapeutic exercises to develop strength and ROM for 40 minutes including:  (Left shoulder)  -wall flexion and abduction slides x 10 for flexion and 5 for abduction  -SL ER 3# 3x10  -serratus punch with 8# x50  -scap retraction during  activity using YTB 3x10  -prone rows with 3# 3x15  -seated horizontal abduction with YTB 3x10  -scapular retraction 60# 3x10-15  -lat pull downs 60# 3x10-15  -IR LUE with 10# 3x10-15   -pec stretch at corner  -sleeper stretch LUE (for IR)  -ER stretch at wall    KWADWO received the following manual therapy techniques: Joint mobilizations and soft tissue massage were applied to the: left shoulder for 15 minutes, including:  -inferior and posteror joint mobs to increase Flexion, Abduction and ER of left shoulder  -Contract-relax techniques to increase Flexion, Abduction and ER/IR      Kwadwo received the following supervised modalities  after being cleared for contradictions: IFC Electrical Stimulation:  Cruz received IFC Electrical Stimulation for pain control applied to the left shoulder. Pt received stimulation at 40 % scan at a frequency of  for 0 minutes. Cruz tolderated treatment well without any adverse effects.  N/A    Chase received hot pack for 8 minutes to left shoulder with  IFC.      Home Exercises Provided and Patient Education Provided     Education provided:   - HEP updated    Written Home Exercises Provided: yes.  Exercises were reviewed and CHASE was able to demonstrate them prior to the end of the session.  CHASE demonstrated good  understanding of the education provided.       Assessment   Chase is slowly progressing with LUE shoulder ROM. Joint mobs and exercises allow him to improve the ROM by the end of the session. He continues to demonstrate general muscle weakness of the left shoulder.     CHASE is progressing well towards his goals.   Pt prognosis is Excellent.     Pt will continue to benefit from skilled outpatient physical therapy to address the deficits listed in the problem list box on initial evaluation, provide pt/family education and to maximize pt's level of independence in the home and community environment.     Pt's spiritual, cultural and educational needs considered and pt agreeable to plan of care and goals.     Anticipated barriers to physical therapy: none anticipated    Goals:   Short Term Goals: In 4 weeks   1.I with HEP  2.Patient to increase GH ROM of left shoulder flexion, abduction and ER by 30 degrees and left IR by 20 degrees    3.Patient to increase MMT strength of left abduction to 3-/5    4.Patient to have pain less than 4/10 during activity  5.Patient to score less than 20% impaired on the QUICK DASH     Long Term Goals: In 10 weeks  1. Patient to score less than 5% impaired on the QUICK DASH  2. Patient to demo increase in UE strength to 5/5 throughout shoulder joint  3. Patient to  have decreased pain to 2/10 or less during activity  4. Patient to demo increase GH ROM to WNLs all planes  5. Patient to perform daily activities including lifting objects overhead without limitation.        Plan   Plan of care Certification: 5/30/2019 to 7/29/2019     Outpatient Physical Therapy 2 times weekly to include the following interventions: Electrical Stimulation IFC, Manual Therapy, Moist Heat/ Ice and Therapeutic Exercise.     Lashonda Steele, PT

## 2019-06-23 NOTE — PROGRESS NOTES
Physical Therapy Daily Treatment Note     Name: Cruz Tran  [facility] Number: [id number]    Therapy Diagnosis:   Encounter Diagnosis   Name Primary?    Other closed displaced fracture of proximal end of left humerus with routine healing, subsequent encounter Yes     Physician: Reji Banks MD    Visit Date: 6/24/2019    Physician Orders: PT Eval and Treat   Medical Diagnosis from Referral: closed displaced fx of proximal end of left humerus with routine healing, subsequent encounter  Evaluation Date: 5/30/2019  Authorization Period Expiration: 8/29/2019  Plan of Care Expiration: 7/29/2019  Visit # / Visits authorized: 7/12    Time In: 10:30am  Time Out: 11:30  Total Billable Time: 60  minutes    Precautions: Standard    Subjective     Pt reports: overworking his left arm at work 2 days ago but resting it yesterday.  He was compliant with home exercise program.  Response to previous treatment: good  Functional change: n/a at this time  -  Pain: 6/10 at end-range of shoulder motions  Location: left shoulder     Bob STONE received therapeutic exercises to develop strength and ROM for 45 minutes including:  (Left shoulder)  -wall flexion and abduction slides x 10 for flexion and 5 for abduction  -SL ER 3# 3x10  -serratus punch with GTB in sitting x 45 (BUE)  -scap retraction during  activity using YTB 3x10  -prone rows with 3# 3x15  -SL abduction x 10 (no weights)  -seated horizontal abduction with YTB 3x10  -Anterior and middle deltoids 2x10: YTB for flexion, no resistance for abduction (standing)  -scapular retraction 40# 3x10  -lat pull downs 30# 3x10  -IR LUE with 10# 3x10-15   -pec stretch at corner  -sleeper stretch LUE (for IR)  -ER stretch at wall    CHASE received the following manual therapy techniques: Joint mobilizations and soft tissue massage were applied to the: left shoulder for 15 minutes, including:  -inferior and posteror joint mobs to increase Flexion, Abduction and ER of  left shoulder  -Contract-relax techniques to increase Flexion, Abduction and ER/IR  -subscapular release Left      Chase received the following supervised modalities after being cleared for contradictions: IFC Electrical Stimulation:  Cruz received IFC Electrical Stimulation for pain control applied to the left shoulder. Pt received stimulation at 40 % scan at a frequency of  for 0 minutes. Cruz tolderated treatment well without any adverse effects.  N/A    Chase received hot pack for 0 minutes to left shoulder: n/a      Home Exercises Provided and Patient Education Provided     Education provided:   - HEP updated    Written Home Exercises Provided: yes.  Exercises were reviewed and CHASE was able to demonstrate them prior to the end of the session.  CHASE demonstrated good  understanding of the education provided.       Assessment   Chase is slowly progressing with LUE shoulder ROM. Joint mobs and exercises allow him to improve the ROM by the end of the session. He continues to demonstrate general muscle weakness of the left shoulder. Weights were decreased on the cable weights today due to compensatory patterns demonstrated with heavier resistance.     CHASE is progressing well towards his goals.   Pt prognosis is Excellent.     Pt will continue to benefit from skilled outpatient physical therapy to address the deficits listed in the problem list box on initial evaluation, provide pt/family education and to maximize pt's level of independence in the home and community environment.     Pt's spiritual, cultural and educational needs considered and pt agreeable to plan of care and goals.     Anticipated barriers to physical therapy: none anticipated    Goals:   Short Term Goals: In 4 weeks   1.I with HEP  2.Patient to increase GH ROM of left shoulder flexion, abduction and ER by 30 degrees and left IR by 20 degrees    3.Patient to increase MMT strength of left abduction to 3-/5    4.Patient to have pain  less than 4/10 during activity  5.Patient to score less than 20% impaired on the QUICK DASH     Long Term Goals: In 10 weeks  1. Patient to score less than 5% impaired on the QUICK DASH  2. Patient to demo increase in UE strength to 5/5 throughout shoulder joint  3. Patient to have decreased pain to 2/10 or less during activity  4. Patient to demo increase GH ROM to WNLs all planes  5. Patient to perform daily activities including lifting objects overhead without limitation.        Plan   Plan of care Certification: 5/30/2019 to 7/29/2019     Outpatient Physical Therapy 2 times weekly to include the following interventions: Electrical Stimulation IFC, Manual Therapy, Moist Heat/ Ice and Therapeutic Exercise.     Lashonda Steele, PT

## 2019-06-24 ENCOUNTER — CLINICAL SUPPORT (OUTPATIENT)
Dept: REHABILITATION | Facility: HOSPITAL | Age: 59
End: 2019-06-24
Payer: OTHER MISCELLANEOUS

## 2019-06-24 DIAGNOSIS — S42.292D OTHER CLOSED DISPLACED FRACTURE OF PROXIMAL END OF LEFT HUMERUS WITH ROUTINE HEALING, SUBSEQUENT ENCOUNTER: Primary | ICD-10-CM

## 2019-06-24 PROCEDURE — 97110 THERAPEUTIC EXERCISES: CPT

## 2019-06-24 PROCEDURE — 97140 MANUAL THERAPY 1/> REGIONS: CPT

## 2019-07-11 ENCOUNTER — OFFICE VISIT (OUTPATIENT)
Dept: ORTHOPEDICS | Facility: CLINIC | Age: 59
End: 2019-07-11
Payer: OTHER MISCELLANEOUS

## 2019-07-11 ENCOUNTER — HOSPITAL ENCOUNTER (OUTPATIENT)
Dept: RADIOLOGY | Facility: HOSPITAL | Age: 59
Discharge: HOME | End: 2019-07-11
Attending: ORTHOPAEDIC SURGERY
Payer: OTHER MISCELLANEOUS

## 2019-07-11 VITALS
HEIGHT: 71 IN | RESPIRATION RATE: 18 BRPM | BODY MASS INDEX: 29.96 KG/M2 | HEART RATE: 69 BPM | WEIGHT: 214 LBS | SYSTOLIC BLOOD PRESSURE: 132 MMHG | DIASTOLIC BLOOD PRESSURE: 76 MMHG

## 2019-07-11 DIAGNOSIS — M75.02 SECONDARY ADHESIVE CAPSULITIS OF LEFT SHOULDER: ICD-10-CM

## 2019-07-11 DIAGNOSIS — M25.512 ACUTE PAIN OF LEFT SHOULDER: ICD-10-CM

## 2019-07-11 DIAGNOSIS — S42.292D OTHER CLOSED DISPLACED FRACTURE OF PROXIMAL END OF LEFT HUMERUS WITH ROUTINE HEALING, SUBSEQUENT ENCOUNTER: ICD-10-CM

## 2019-07-11 DIAGNOSIS — S42.292D OTHER CLOSED DISPLACED FRACTURE OF PROXIMAL END OF LEFT HUMERUS WITH ROUTINE HEALING, SUBSEQUENT ENCOUNTER: Primary | ICD-10-CM

## 2019-07-11 DIAGNOSIS — M25.512 LEFT SHOULDER PAIN, UNSPECIFIED CHRONICITY: Primary | ICD-10-CM

## 2019-07-11 PROCEDURE — 73030 XR SHOULDER COMPLETE 2 OR MORE VIEWS LEFT: ICD-10-PCS | Mod: 26,LT,, | Performed by: RADIOLOGY

## 2019-07-11 PROCEDURE — 99214 PR OFFICE/OUTPT VISIT, EST, LEVL IV, 30-39 MIN: ICD-10-PCS | Mod: 25,S$GLB,, | Performed by: ORTHOPAEDIC SURGERY

## 2019-07-11 PROCEDURE — 97110 PR THERAPEUTIC EXERCISES: ICD-10-PCS | Mod: GP,S$GLB,, | Performed by: ORTHOPAEDIC SURGERY

## 2019-07-11 PROCEDURE — 99999 PR PBB SHADOW E&M-EST. PATIENT-LVL III: ICD-10-PCS | Mod: PBBFAC,,, | Performed by: ORTHOPAEDIC SURGERY

## 2019-07-11 PROCEDURE — 73030 X-RAY EXAM OF SHOULDER: CPT | Mod: 26,LT,, | Performed by: RADIOLOGY

## 2019-07-11 PROCEDURE — 99214 OFFICE O/P EST MOD 30 MIN: CPT | Mod: 25,S$GLB,, | Performed by: ORTHOPAEDIC SURGERY

## 2019-07-11 PROCEDURE — 97110 THERAPEUTIC EXERCISES: CPT | Mod: GP,S$GLB,, | Performed by: ORTHOPAEDIC SURGERY

## 2019-07-11 PROCEDURE — 73030 X-RAY EXAM OF SHOULDER: CPT | Mod: TC,PO,LT

## 2019-07-11 PROCEDURE — 99999 PR PBB SHADOW E&M-EST. PATIENT-LVL III: CPT | Mod: PBBFAC,,, | Performed by: ORTHOPAEDIC SURGERY

## 2019-07-11 RX ORDER — METHYLPREDNISOLONE 4 MG/1
TABLET ORAL
Qty: 1 PACKAGE | Refills: 0 | Status: SHIPPED | OUTPATIENT
Start: 2019-07-11 | End: 2019-08-01

## 2019-07-11 RX ORDER — DICLOFENAC SODIUM 10 MG/G
2 GEL TOPICAL 4 TIMES DAILY
Qty: 1 TUBE | Refills: 1 | Status: SHIPPED | OUTPATIENT
Start: 2019-07-11

## 2019-07-11 NOTE — PROGRESS NOTES
Subjective:     Patient ID: Cruz Tran is a 59 y.o. male.    Chief Complaint: Pain of the Left Shoulder    7/11/19: He is here for follow up now 4.5 months status post left proximal humerus fracture. Left shoulder pain improving. Working on ROM but having some residual stiffness.    5/23/19: he had CT 3/14/19 and states that no one from our office ever contacted him for follow up. He is here today, has been using shoulder for ROM, protecting it. Pain is 1/10, much better. Has not started any physical therapy. Lives in .    3/7/19: He had a fall onto his left shoulder 2 days ago, impacted his left shoulder immediate pain. He had a prior left humerus fracture here that was fix, underwent open reduction internal fixation by a surgeon years ago.  No issues until his recent fall.  His pain is more proximal the shoulder that was before.  He is in a sling today. He works for he works for [employer].  This is a worker's Comp claim.    Shoulder Injury    The pain is present in the left shoulder. The current episode started more than 1 month ago (wednesday 3/6). There has been a history of trauma. The injury was the result of a pulling and falling (pt states he was pulling pallet eleno and he tripped over another pallet behind him and fell in a parking lot on his shoulder) action while at work. The problem occurs constantly. The problem has been gradually improving. The quality of the pain is described as aching, sharp and shooting. The pain is at a severity of 1/10. Pertinent negatives include no fever or itching. The symptoms are aggravated by activity. He has tried oral narcotics for the symptoms. The treatment provided moderate relief. Physical therapy was not tried.      Past Medical History:   Diagnosis Date    Hypertension      Past Surgical History:   Procedure Laterality Date    arm surgery      HERNIA REPAIR       History reviewed. No pertinent family history.  Social History     Socioeconomic History     Marital status:      Spouse name: Not on file    Number of children: Not on file    Years of education: Not on file    Highest education level: Not on file   Occupational History    Not on file   Social Needs    Financial resource strain: Not on file    Food insecurity:     Worry: Not on file     Inability: Not on file    Transportation needs:     Medical: Not on file     Non-medical: Not on file   Tobacco Use    Smoking status: Current Every Day Smoker    Smokeless tobacco: Never Used   Substance and Sexual Activity    Alcohol use: Yes     Frequency: Never    Drug use: Not on file    Sexual activity: Not on file   Lifestyle    Physical activity:     Days per week: Not on file     Minutes per session: Not on file    Stress: Not on file   Relationships    Social connections:     Talks on phone: Not on file     Gets together: Not on file     Attends [employer] service: Not on file     Active member of club or organization: Not on file     Attends meetings of clubs or organizations: Not on file     Relationship status: Not on file   Other Topics Concern    Not on file   Social History Narrative    Not on file     Medication List with Changes/Refills   Current Medications    AMLODIPINE (NORVASC) 10 MG TABLET    Take 10 mg by mouth.    ASPIRIN (ECOTRIN) 81 MG EC TABLET    Take 81 mg by mouth.    LISINOPRIL 10 MG TABLET    Take 10 mg by mouth.     Review of patient's allergies indicates:  No Known Allergies  Review of Systems   Constitution: Negative for fever.   HENT: Negative for sore throat.    Eyes: Negative for blurred vision.   Cardiovascular: Negative for dyspnea on exertion.   Respiratory: Negative for shortness of breath.    Hematologic/Lymphatic: Does not bruise/bleed easily.   Skin: Negative for itching.   Musculoskeletal: Positive for joint pain and joint swelling.   Gastrointestinal: Negative for vomiting.   Genitourinary: Negative for dysuria.   Neurological: Negative for  "dizziness.   Psychiatric/Behavioral: The patient does not have insomnia.        Objective:   Body mass index is 29.85 kg/m².  Vitals:    07/11/19 0834   BP: 132/76   Pulse: 69   Resp: 18           General    Nursing note and vitals reviewed.  Constitutional: He is oriented to person, place, and time. He appears well-developed. No distress.   HENT:   Head: Normocephalic and atraumatic.   Eyes: EOM are normal.   Cardiovascular: Normal rate.    Pulmonary/Chest: Effort normal. No stridor.   Neurological: He is alert and oriented to person, place, and time.   Psychiatric: He has a normal mood and affect. His behavior is normal.             Left Shoulder Exam     Inspection/Observation   Scars: present    Other   Sensation: normal     Comments:  Skin intact, sensation light touch intact radial ulnar and median nerve distributions, intact anterior interosseous nerve, posterior interosseous nerve, radial nerve function, reports history of prior wrist injury,     AROM 130 FF  60 ABD  25 ER  IR L5      Muscle Strength   Right Upper Extremity   Shoulder Internal Rotation: 5/5   Shoulder External Rotation: 5/5   Supraspinatus: 5/5/5   Subscapularis: 5/5/5   Left Upper Extremity  Shoulder Internal Rotation: 5/5   Left shoulder external rotation strength: 5-/5.   Supraspinatus: 5/5/5   Subscapularis: 5/5/5     Vascular Exam       Capillary Refill  Left Hand: normal capillary refill      Radiographs / Imaging : Results reviewed by me and interpreted by me, discussed with the patient and / or family     Radiographs today show no interval displacement - some evidence of bridging and "smoothing" of fracture line superior aspect of humeral head. Overall alignment is good and unchanged.    Assessment:     Encounter Diagnoses   Name Primary?    Acute pain of left shoulder     Other closed displaced fracture of proximal end of left humerus with routine healing, subsequent encounter Yes    Secondary adhesive capsulitis of left shoulder "         Plan:     HEP 88778 - I, instructed and demonstrated a Left shoulder stretching HEP. The patient then demonstrated understanding of exercises and proper technique. This program was performed for 15 minutes.     voltaren gel topical RX PRN left shoulder    Medrol dose pack for secondary adhesive capsulitis to improve stiffness    No more than 5 lbs lifting, no repetitive lifting - can re eval next visit    F/u 3 weeks no radiographs unless worsening symptoms, ROM check,    If ROM not improving sufficiently then recommend left shoulder CSI subacromial

## 2019-07-12 ENCOUNTER — TELEPHONE (OUTPATIENT)
Dept: ORTHOPEDICS | Facility: CLINIC | Age: 59
End: 2019-07-12

## 2019-07-12 NOTE — TELEPHONE ENCOUNTER
----- Message from Jadyn Esteves sent at 7/12/2019  3:23 PM CDT -----  Contact: [employer]ble w/workers comp  Caller request a call back to verify if pt came to appointment  , caller stated he called multiple time no response ....call back : [phone] ext :[phone]

## 2019-08-01 ENCOUNTER — OFFICE VISIT (OUTPATIENT)
Dept: ORTHOPEDICS | Facility: CLINIC | Age: 59
End: 2019-08-01
Payer: OTHER MISCELLANEOUS

## 2019-08-01 VITALS
WEIGHT: 214.06 LBS | DIASTOLIC BLOOD PRESSURE: 70 MMHG | SYSTOLIC BLOOD PRESSURE: 122 MMHG | BODY MASS INDEX: 29.97 KG/M2 | HEART RATE: 71 BPM | HEIGHT: 71 IN

## 2019-08-01 DIAGNOSIS — M75.02 SECONDARY ADHESIVE CAPSULITIS OF LEFT SHOULDER: Primary | ICD-10-CM

## 2019-08-01 DIAGNOSIS — S42.292D OTHER CLOSED DISPLACED FRACTURE OF PROXIMAL END OF LEFT HUMERUS WITH ROUTINE HEALING, SUBSEQUENT ENCOUNTER: ICD-10-CM

## 2019-08-01 PROCEDURE — 20610 LARGE JOINT ASPIRATION/INJECTION: L SUBACROMIAL BURSA: ICD-10-PCS | Mod: LT,S$GLB,, | Performed by: ORTHOPAEDIC SURGERY

## 2019-08-01 PROCEDURE — 20610 DRAIN/INJ JOINT/BURSA W/O US: CPT | Mod: LT,S$GLB,, | Performed by: ORTHOPAEDIC SURGERY

## 2019-08-01 PROCEDURE — 99999 PR PBB SHADOW E&M-EST. PATIENT-LVL III: CPT | Mod: PBBFAC,,, | Performed by: ORTHOPAEDIC SURGERY

## 2019-08-01 PROCEDURE — 99999 PR PBB SHADOW E&M-EST. PATIENT-LVL III: ICD-10-PCS | Mod: PBBFAC,,, | Performed by: ORTHOPAEDIC SURGERY

## 2019-08-01 PROCEDURE — 99214 OFFICE O/P EST MOD 30 MIN: CPT | Mod: 25,S$GLB,, | Performed by: ORTHOPAEDIC SURGERY

## 2019-08-01 PROCEDURE — 99214 PR OFFICE/OUTPT VISIT, EST, LEVL IV, 30-39 MIN: ICD-10-PCS | Mod: 25,S$GLB,, | Performed by: ORTHOPAEDIC SURGERY

## 2019-08-01 RX ORDER — METHYLPREDNISOLONE ACETATE 80 MG/ML
80 INJECTION, SUSPENSION INTRA-ARTICULAR; INTRALESIONAL; INTRAMUSCULAR; SOFT TISSUE
Status: DISCONTINUED | OUTPATIENT
Start: 2019-08-01 | End: 2019-08-01 | Stop reason: HOSPADM

## 2019-08-01 RX ADMIN — METHYLPREDNISOLONE ACETATE 80 MG: 80 INJECTION, SUSPENSION INTRA-ARTICULAR; INTRALESIONAL; INTRAMUSCULAR; SOFT TISSUE at 08:08

## 2019-08-01 NOTE — PROCEDURES
Large Joint Aspiration/Injection: L subacromial bursa  Date/Time: 8/1/2019 8:50 AM  Performed by: Reji Banks MD  Authorized by: Reji Banks MD     Consent Done?:  Yes (Verbal)  Indications:  Pain and diagnostic evaluation  Procedure site marked: Yes    Timeout: Prior to procedure the correct patient, procedure, and site was verified      Location:  Shoulder  Site:  L subacromial bursa  Prep: Patient was prepped and draped in usual sterile fashion    Ultrasonic Guidance for needle placement: No  Needle size:  22 G  Approach: posterolateral.  Medications:  80 mg methylPREDNISolone acetate 80 mg/mL  Patient tolerance:  Patient tolerated the procedure well with no immediate complications    Additional Comments: The patient had no adverse reactions to the medication. The patient was instructed to apply ice to the joint for 30 minutes on and 30 minutes off for the next 48 hours, and avoid strenuous activities for 48 hours following the injection. Patient was warned of possible blood sugar and/or blood pressure changes during that time regarding corticosteroid injections if applicable.  Following that time, patient can resume regular activities. Note that informed consent was performed with the patient before injection discussing risks, benefits, indications and alternatives to injection, risks including but not limited to bleeding and infection.

## 2019-08-01 NOTE — PROGRESS NOTES
Subjective:     Patient ID: Cruz Tran is a 59 y.o. male.    Chief Complaint: Injury of the Left Shoulder    8/1/19: he is here now 5 months sp left proximal humerus fracture, adhesive capsulitis. Pain improving, working on ROM but no significant improvement since last visit, he states    7/11/19: He is here for follow up now 4.5 months status post left proximal humerus fracture. Left shoulder pain improving. Working on ROM but having some residual stiffness.    5/23/19: he had CT 3/14/19 and states that no one from our office ever contacted him for follow up. He is here today, has been using shoulder for ROM, protecting it. Pain is 1/10, much better. Has not started any physical therapy. Lives in .    3/7/19: He had a fall onto his left shoulder 2 days ago, impacted his left shoulder immediate pain. He had a prior left humerus fracture here that was fix, underwent open reduction internal fixation by a surgeon years ago.  No issues until his recent fall.  His pain is more proximal the shoulder that was before.  He is in a sling today. He works for he works for [employer].  This is a worker's Comp claim.    Shoulder Injury    The pain is present in the left shoulder. The current episode started more than 1 month ago (wednesday 3/6). There has been a history of trauma. The injury was the result of a pulling and falling (pt states he was pulling pallet eleno and he tripped over another pallet behind him and fell in a parking lot on his shoulder) action while at work. The problem occurs constantly. The problem has been gradually improving (since last visit ROM has plateaued). The quality of the pain is described as aching, sharp and shooting. The pain is at a severity of 0/10. Pertinent negatives include no fever or itching. The symptoms are aggravated by activity. He has tried oral narcotics for the symptoms. The treatment provided moderate relief. Physical therapy was not tried.      Past Medical History:    Diagnosis Date    Hypertension      Past Surgical History:   Procedure Laterality Date    arm surgery      HERNIA REPAIR       No family history on file.  Social History     Socioeconomic History    Marital status:      Spouse name: Not on file    Number of children: Not on file    Years of education: Not on file    Highest education level: Not on file   Occupational History    Not on file   Social Needs    Financial resource strain: Not on file    Food insecurity:     Worry: Not on file     Inability: Not on file    Transportation needs:     Medical: Not on file     Non-medical: Not on file   Tobacco Use    Smoking status: Current Every Day Smoker    Smokeless tobacco: Never Used   Substance and Sexual Activity    Alcohol use: Yes     Frequency: Never    Drug use: Not on file    Sexual activity: Not on file   Lifestyle    Physical activity:     Days per week: Not on file     Minutes per session: Not on file    Stress: Not on file   Relationships    Social connections:     Talks on phone: Not on file     Gets together: Not on file     Attends Mu-ism service: Not on file     Active member of club or organization: Not on file     Attends meetings of clubs or organizations: Not on file     Relationship status: Not on file   Other Topics Concern    Not on file   Social History Narrative    Not on file     Medication List with Changes/Refills   Current Medications    AMLODIPINE (NORVASC) 10 MG TABLET    Take 10 mg by mouth.    ASPIRIN (ECOTRIN) 81 MG EC TABLET    Take 81 mg by mouth.    DICLOFENAC SODIUM (VOLTAREN) 1 % GEL    Apply 2 g topically 4 (four) times daily.    LISINOPRIL 10 MG TABLET    Take 10 mg by mouth.   Discontinued Medications    METHYLPREDNISOLONE (MEDROL DOSEPACK) 4 MG TABLET    use as directed     Review of patient's allergies indicates:  No Known Allergies  Review of Systems   Constitution: Negative for fever.   HENT: Negative for sore throat.    Eyes: Negative for  blurred vision.   Cardiovascular: Negative for dyspnea on exertion.   Respiratory: Negative for shortness of breath.    Hematologic/Lymphatic: Does not bruise/bleed easily.   Skin: Negative for itching.   Gastrointestinal: Negative for vomiting.   Genitourinary: Negative for dysuria.   Neurological: Negative for dizziness.   Psychiatric/Behavioral: The patient does not have insomnia.        Objective:   Body mass index is 29.86 kg/m².  Vitals:    08/01/19 0812   BP: 122/70   Pulse: 71           General    Nursing note and vitals reviewed.  Constitutional: He is oriented to person, place, and time. He appears well-developed. No distress.   HENT:   Head: Normocephalic and atraumatic.   Eyes: EOM are normal.   Cardiovascular: Normal rate.    Pulmonary/Chest: Effort normal. No stridor.   Neurological: He is alert and oriented to person, place, and time.   Psychiatric: He has a normal mood and affect. His behavior is normal.             Left Shoulder Exam     Inspection/Observation   Scars: present    Other   Sensation: normal     Comments:  Skin intact, sensation light touch intact radial ulnar and median nerve distributions, intact anterior interosseous nerve, posterior interosseous nerve, radial nerve function, reports history of prior wrist injury,     AROM 135 FF  60 ABD  25 ER  IR L4      Muscle Strength   Right Upper Extremity   Shoulder Internal Rotation: 5/5   Shoulder External Rotation: 5/5   Supraspinatus: 5/5/5   Subscapularis: 5/5/5   Left Upper Extremity  Shoulder Internal Rotation: 5/5   Left shoulder external rotation strength: 5-/5.   Supraspinatus: 5/5/5   Subscapularis: 5/5/5     Vascular Exam       Capillary Refill  Left Hand: normal capillary refill        Assessment:     Encounter Diagnoses   Name Primary?    Other closed displaced fracture of proximal end of left humerus with routine healing, subsequent encounter     Secondary adhesive capsulitis of left shoulder Yes        Plan:     He has  reached a plateau with his range of motion to his left shoulder, with his adhesive capsulitis. Offered left shoulder subacromial corticosteroid injection today. Discuss pros and cons, risks and benefits, small risk for infection. Discuss that hopefully this will help him increase his range of motion combined with HEP stretching. Discuss that if this does not sufficiently help him, could always consider intra-articular radiographic guided injection in the future.    voltaren gel topical RX PRN left shoulder    Can increase lifting to tolerance, slowly    F/u PRN

## 2019-08-02 ENCOUNTER — TELEPHONE (OUTPATIENT)
Dept: ORTHOPEDICS | Facility: CLINIC | Age: 59
End: 2019-08-02

## 2019-08-02 NOTE — TELEPHONE ENCOUNTER
Called edward from [facility] (nurse ) I informed that our worker comp department will contact her with any question she has. Edward understood.           ----- Message from Jadyn Esteves sent at 8/2/2019 11:41 AM CDT -----  Contact: Edward capone/[facility] (nurse  )  Caller request a call back to verify if pymt showed for appt   Call back: [phone]

## 2019-12-23 ENCOUNTER — DOCUMENTATION ONLY (OUTPATIENT)
Dept: REHABILITATION | Facility: HOSPITAL | Age: 59
End: 2019-12-23

## 2019-12-23 NOTE — PROGRESS NOTES
Outpatient Therapy Discharge Summary     Name: Cruz Tran  [facility] Number: [id number]    Therapy Diagnosis:        Encounter Diagnosis   Name Primary?    Other closed displaced fracture of proximal end of left humerus with routine healing, subsequent encounter Yes      Physician: Reji Banks MD     Visit Date: 6/24/2019     Physician Orders: PT Eval and Treat   Medical Diagnosis from Referral: closed displaced fx of proximal end of left humerus with routine healing, subsequent encounter  Evaluation Date: 5/30/2019      Date of Last visit: 6/24/2019  Total Visits Received: 7  Cancelled Visits: 0  No Show Visits: 1    Assessment    Goals:   Kwadwo was slowly progressing with LUE shoulder ROM. Joint mobs and exercises allowed him to improve the ROM by the end of each session. He demonstrated general muscle weakness of the left shoulder. He would have benefited from continued PT, but he stopped attending therapy after 7 visits.   Discharge reason: Patient has not attended therapy since 6/24/2019    Plan   This patient is discharged from Physical Therapy

## 2021-04-29 ENCOUNTER — PATIENT MESSAGE (OUTPATIENT)
Dept: RESEARCH | Facility: HOSPITAL | Age: 61
End: 2021-04-29

## 2022-12-16 ENCOUNTER — PATIENT MESSAGE (OUTPATIENT)
Dept: RESEARCH | Facility: HOSPITAL | Age: 62
End: 2022-12-16
Payer: OTHER MISCELLANEOUS

## 2024-01-14 ENCOUNTER — HOSPITAL ENCOUNTER (EMERGENCY)
Facility: HOSPITAL | Age: 64
Discharge: HOME | End: 2024-01-15
Attending: EMERGENCY MEDICINE
Payer: MEDICARE

## 2024-01-14 DIAGNOSIS — T18.128A OBSTRUCTION OF ESOPHAGUS DUE TO FOOD IMPACTION: ICD-10-CM
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
## 2024-01-14 DIAGNOSIS — R13.19 ESOPHAGEAL DYSPHAGIA: ICD-10-CM
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
## 2024-01-14 DIAGNOSIS — T18.128A FOOD IMPACTION OF ESOPHAGUS, INITIAL ENCOUNTER: Primary | ICD-10-CM

## 2024-01-14 LAB
ALBUMIN SERPL BCP-MCNC: 4.3 G/DL (ref 3.5–5.2)
ALP SERPL-CCNC: 92 U/L (ref 55–135)
ALT SERPL W/O P-5'-P-CCNC: 29 U/L (ref 10–44)
ANION GAP SERPL CALC-SCNC: 16 MMOL/L (ref 8–16)
AST SERPL-CCNC: 29 U/L (ref 10–40)
BASOPHILS # BLD AUTO: 0.08 K/UL (ref 0–0.2)
BASOPHILS NFR BLD: 0.9 % (ref 0–1.9)
BILIRUB SERPL-MCNC: 0.8 MG/DL (ref 0.1–1)
BUN SERPL-MCNC: 16 MG/DL (ref 8–23)
CALCIUM SERPL-MCNC: 9.8 MG/DL (ref 8.7–10.5)
CHLORIDE SERPL-SCNC: 102 MMOL/L (ref 95–110)
CO2 SERPL-SCNC: 23 MMOL/L (ref 23–29)
CREAT SERPL-MCNC: 1.1 MG/DL (ref 0.5–1.4)
DIFFERENTIAL METHOD BLD: ABNORMAL
EOSINOPHIL # BLD AUTO: 0.4 K/UL (ref 0–0.5)
EOSINOPHIL NFR BLD: 4.1 % (ref 0–8)
ERYTHROCYTE [DISTWIDTH] IN BLOOD BY AUTOMATED COUNT: 12.3 % (ref 11.5–14.5)
EST. GFR  (NO RACE VARIABLE): >60 ML/MIN/1.73 M^2
GLUCOSE SERPL-MCNC: 94 MG/DL (ref 70–110)
HCT VFR BLD AUTO: 49.6 % (ref 40–54)
HGB BLD-MCNC: 17.3 G/DL (ref 14–18)
IMM GRANULOCYTES # BLD AUTO: 0.06 K/UL (ref 0–0.04)
IMM GRANULOCYTES NFR BLD AUTO: 0.6 % (ref 0–0.5)
LYMPHOCYTES # BLD AUTO: 1.7 K/UL (ref 1–4.8)
LYMPHOCYTES NFR BLD: 18.3 % (ref 18–48)
MCH RBC QN AUTO: 35.4 PG (ref 27–31)
MCHC RBC AUTO-ENTMCNC: 34.9 G/DL (ref 32–36)
MCV RBC AUTO: 101 FL (ref 82–98)
MONOCYTES # BLD AUTO: 0.7 K/UL (ref 0.3–1)
MONOCYTES NFR BLD: 7.8 % (ref 4–15)
NEUTROPHILS # BLD AUTO: 6.4 K/UL (ref 1.8–7.7)
NEUTROPHILS NFR BLD: 68.3 % (ref 38–73)
NRBC BLD-RTO: 0 /100 WBC
PLATELET # BLD AUTO: 268 K/UL (ref 150–450)
PMV BLD AUTO: 9.2 FL (ref 9.2–12.9)
POTASSIUM SERPL-SCNC: 4.2 MMOL/L (ref 3.5–5.1)
PROT SERPL-MCNC: 8.8 G/DL (ref 6–8.4)
RBC # BLD AUTO: 4.89 M/UL (ref 4.6–6.2)
SODIUM SERPL-SCNC: 141 MMOL/L (ref 136–145)
TROPONIN I SERPL DL<=0.01 NG/ML-MCNC: <0.006 NG/ML (ref 0–0.03)
WBC # BLD AUTO: 9.29 K/UL (ref 3.9–12.7)

## 2024-01-14 PROCEDURE — 85025 COMPLETE CBC W/AUTO DIFF WBC: CPT | Performed by: NURSE PRACTITIONER

## 2024-01-14 PROCEDURE — 99284 EMERGENCY DEPT VISIT MOD MDM: CPT | Mod: 25

## 2024-01-14 PROCEDURE — 96372 THER/PROPH/DIAG INJ SC/IM: CPT | Mod: 59 | Performed by: NURSE PRACTITIONER

## 2024-01-14 PROCEDURE — 96375 TX/PRO/DX INJ NEW DRUG ADDON: CPT

## 2024-01-14 PROCEDURE — 63600175 PHARM REV CODE 636 W HCPCS: Mod: JZ,JG | Performed by: NURSE PRACTITIONER

## 2024-01-14 PROCEDURE — 96374 THER/PROPH/DIAG INJ IV PUSH: CPT

## 2024-01-14 PROCEDURE — 84484 ASSAY OF TROPONIN QUANT: CPT | Performed by: NURSE PRACTITIONER

## 2024-01-14 PROCEDURE — 80053 COMPREHEN METABOLIC PANEL: CPT | Performed by: NURSE PRACTITIONER

## 2024-01-14 PROCEDURE — 63600175 PHARM REV CODE 636 W HCPCS: Performed by: EMERGENCY MEDICINE

## 2024-01-14 RX ORDER — GLUCAGON 1 MG
1 KIT INJECTION
Status: COMPLETED | OUTPATIENT
Start: 2024-01-14 | End: 2024-01-14

## 2024-01-14 RX ORDER — LORAZEPAM 2 MG/ML
1 INJECTION INTRAMUSCULAR
Status: COMPLETED | OUTPATIENT
Start: 2024-01-14 | End: 2024-01-14

## 2024-01-14 RX ADMIN — GLUCAGON 1 MG: 1 INJECTION, POWDER, LYOPHILIZED, FOR SOLUTION INTRAMUSCULAR; INTRAVENOUS at 06:01

## 2024-01-14 RX ADMIN — LORAZEPAM 1 MG: 2 INJECTION INTRAMUSCULAR; INTRAVENOUS at 10:01

## 2024-01-14 RX ADMIN — GLUCAGON 1 MG: 1 INJECTION, POWDER, LYOPHILIZED, FOR SOLUTION INTRAMUSCULAR; INTRAVENOUS at 10:01

## 2024-01-15 ENCOUNTER — ANESTHESIA (OUTPATIENT)
Dept: ENDOSCOPY | Facility: HOSPITAL | Age: 64
End: 2024-01-15
Payer: MEDICARE

## 2024-01-15 ENCOUNTER — ANESTHESIA EVENT (OUTPATIENT)
Dept: ENDOSCOPY | Facility: HOSPITAL | Age: 64
End: 2024-01-15
Payer: MEDICARE

## 2024-01-15 VITALS
OXYGEN SATURATION: 96 % | WEIGHT: 229.75 LBS | DIASTOLIC BLOOD PRESSURE: 78 MMHG | TEMPERATURE: 98 F | RESPIRATION RATE: 20 BRPM | BODY MASS INDEX: 32.04 KG/M2 | SYSTOLIC BLOOD PRESSURE: 140 MMHG | HEART RATE: 91 BPM

## 2024-01-15 DIAGNOSIS — R13.19 ESOPHAGEAL DYSPHAGIA: Primary | ICD-10-CM
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
## 2024-01-15 PROBLEM — T18.128A OBSTRUCTION OF ESOPHAGUS DUE TO FOOD IMPACTION: Status: ACTIVE | Noted: 2024-01-15

## 2024-01-15 PROCEDURE — 37000009 HC ANESTHESIA EA ADD 15 MINS: Performed by: INTERNAL MEDICINE

## 2024-01-15 PROCEDURE — 43247 EGD REMOVE FOREIGN BODY: CPT | Performed by: INTERNAL MEDICINE

## 2024-01-15 PROCEDURE — 99499 UNLISTED E&M SERVICE: CPT | Mod: ,,, | Performed by: INTERNAL MEDICINE

## 2024-01-15 PROCEDURE — 43239 EGD BIOPSY SINGLE/MULTIPLE: CPT | Mod: 51,,, | Performed by: INTERNAL MEDICINE

## 2024-01-15 PROCEDURE — 88342 IMHCHEM/IMCYTCHM 1ST ANTB: CPT | Performed by: STUDENT IN AN ORGANIZED HEALTH CARE EDUCATION/TRAINING PROGRAM

## 2024-01-15 PROCEDURE — 88305 TISSUE EXAM BY PATHOLOGIST: CPT | Performed by: STUDENT IN AN ORGANIZED HEALTH CARE EDUCATION/TRAINING PROGRAM

## 2024-01-15 PROCEDURE — 43247 EGD REMOVE FOREIGN BODY: CPT | Mod: ,,, | Performed by: INTERNAL MEDICINE

## 2024-01-15 PROCEDURE — 25000003 PHARM REV CODE 250: Performed by: NURSE ANESTHETIST, CERTIFIED REGISTERED

## 2024-01-15 PROCEDURE — 37000008 HC ANESTHESIA 1ST 15 MINUTES: Performed by: INTERNAL MEDICINE

## 2024-01-15 PROCEDURE — 43239 EGD BIOPSY SINGLE/MULTIPLE: CPT | Performed by: INTERNAL MEDICINE

## 2024-01-15 PROCEDURE — 88305 TISSUE EXAM BY PATHOLOGIST: CPT | Mod: 26,,, | Performed by: STUDENT IN AN ORGANIZED HEALTH CARE EDUCATION/TRAINING PROGRAM

## 2024-01-15 PROCEDURE — 88342 IMHCHEM/IMCYTCHM 1ST ANTB: CPT | Mod: 26,,, | Performed by: STUDENT IN AN ORGANIZED HEALTH CARE EDUCATION/TRAINING PROGRAM

## 2024-01-15 PROCEDURE — 27201089 HC SNARE, DISP (ANY): Performed by: INTERNAL MEDICINE

## 2024-01-15 PROCEDURE — 63600175 PHARM REV CODE 636 W HCPCS: Performed by: NURSE ANESTHETIST, CERTIFIED REGISTERED

## 2024-01-15 PROCEDURE — 27201012 HC FORCEPS, HOT/COLD, DISP: Performed by: INTERNAL MEDICINE

## 2024-01-15 PROCEDURE — 27201014 HC GRASPER DEVICE: Performed by: INTERNAL MEDICINE

## 2024-01-15 RX ORDER — SODIUM CHLORIDE, SODIUM LACTATE, POTASSIUM CHLORIDE, CALCIUM CHLORIDE 600; 310; 30; 20 MG/100ML; MG/100ML; MG/100ML; MG/100ML
INJECTION, SOLUTION INTRAVENOUS CONTINUOUS PRN
Status: DISCONTINUED | OUTPATIENT
Start: 2024-01-15 | End: 2024-01-15

## 2024-01-15 RX ORDER — LIDOCAINE HYDROCHLORIDE 10 MG/ML
INJECTION, SOLUTION EPIDURAL; INFILTRATION; INTRACAUDAL; PERINEURAL
Status: DISCONTINUED | OUTPATIENT
Start: 2024-01-15 | End: 2024-01-15

## 2024-01-15 RX ORDER — PROPOFOL 10 MG/ML
VIAL (ML) INTRAVENOUS
Status: DISCONTINUED | OUTPATIENT
Start: 2024-01-15 | End: 2024-01-15

## 2024-01-15 RX ORDER — PANTOPRAZOLE SODIUM 40 MG/1
40 TABLET, DELAYED RELEASE ORAL DAILY
Qty: 30 TABLET | Refills: 11 | Status: SHIPPED | OUTPATIENT
Start: 2024-01-15 | End: 2025-01-14

## 2024-01-15 RX ADMIN — PROPOFOL 30 MG: 10 INJECTION, EMULSION INTRAVENOUS at 01:01

## 2024-01-15 RX ADMIN — PROPOFOL 20 MG: 10 INJECTION, EMULSION INTRAVENOUS at 01:01

## 2024-01-15 RX ADMIN — PROPOFOL 40 MG: 10 INJECTION, EMULSION INTRAVENOUS at 01:01

## 2024-01-15 RX ADMIN — LIDOCAINE HYDROCHLORIDE 50 MG: 10 SOLUTION INTRAVENOUS at 01:01

## 2024-01-15 RX ADMIN — SODIUM CHLORIDE, SODIUM LACTATE, POTASSIUM CHLORIDE, AND CALCIUM CHLORIDE: 600; 310; 30; 20 INJECTION, SOLUTION INTRAVENOUS at 01:01

## 2024-01-15 RX ADMIN — PROPOFOL 100 MG: 10 INJECTION, EMULSION INTRAVENOUS at 01:01

## 2024-01-15 NOTE — PROVATION PATIENT INSTRUCTIONS
Discharge Summary/Instructions after an Endoscopic Procedure  Patient Name: Cruz Tran  Patient MRN: [id number]  Patient YOB: 1960  Monday, January 15, 2024 Storm Siegel MD  Dear patient,  As a result of recent federal legislation (The Federal Cures Act), you may   receive lab or pathology results from your procedure in your [facility]   account before your physician is able to contact you. Your physician or   their representative will relay the results to you with their   recommendations at their soonest availability.  Thank you,  RESTRICTIONS:  During your procedure today, you received medications for sedation.  These   medications may affect your judgment, balance and coordination.  Therefore,   for 24 hours, you have the following restrictions:   - DO NOT drive a car, operate machinery, make legal/financial decisions,   sign important papers or drink alcohol.    ACTIVITY:  Today: no heavy lifting, straining or running due to procedural   sedation/anesthesia.  The following day: return to full activity including work.  DIET:  Eat and drink normally unless instructed otherwise.     TREATMENT FOR COMMON SIDE EFFECTS:  - Mild abdominal pain, nausea, belching, bloating or excessive gas:  rest,   eat lightly and use a heating pad.  - Sore Throat: treat with throat lozenges and/or gargle with warm salt   water.  - Because air was used during the procedure, expelling large amounts of air   from your rectum or belching is normal.  - If a bowel prep was taken, you may not have a bowel movement for 1-3 days.    This is normal.  SYMPTOMS TO WATCH FOR AND REPORT TO YOUR PHYSICIAN:  1. Abdominal pain or bloating, other than gas cramps.  2. Chest pain.  3. Back pain.  4. Signs of infection such as: chills or fever occurring within 24 hours   after the procedure.  5. Rectal bleeding, which would show as bright red, maroon, or black stools.   (A tablespoon of blood from the rectum is not serious, especially if    hemorrhoids are present.)  6. Vomiting.  7. Weakness or dizziness.  GO DIRECTLY TO THE NEAREST EMERGENCY ROOM IF YOU HAVE ANY OF THE FOLLOWING:      Difficulty breathing              Chills and/or fever over 101 F   Persistent vomiting and/or vomiting blood   Severe abdominal pain   Severe chest pain   Black, tarry stools   Bleeding- more than one tablespoon   Any other symptom or condition that you feel may need urgent attention  Your doctor recommends these additional instructions:  If any biopsies were taken, your doctors clinic will contact you in 1 to 2   weeks with any results.  - The patient will be observed post-procedure, until all discharge criteria   are met.   - Discharge patient to home (ambulatory).   - Patient has a contact number available for emergencies.  The signs and   symptoms of potential delayed complications were discussed with the   patient.  Return to normal activities tomorrow.  Written discharge   instructions were provided to the patient.   - Continue present medications.   - Await pathology results.   - Repeat upper endoscopy in 3 months per protocol.   - Return to referring physician as previously scheduled.   - Use a proton pump inhibitor PO daily.  For questions, problems or results please call your physician Storm Siegel MD at Work:  [phone]  If you have any questions about the above instructions, call the GI   department at [phone] or call the endoscopy unit at [phone]   from 7am until 3 pm.  [facility] [place], EMERGENCY ROOM PHONE NUMBER:   [phone]  IF A COMPLICATION OR EMERGENCY SITUATION ARISES AND YOU ARE UNABLE TO REACH   YOUR PHYSICIAN - GO DIRECTLY TO THE EMERGENCY ROOM.  I have read or have had read to me these discharge instructions for my   procedure and have received a written copy.  I understand these   instructions and will follow-up with my physician if I have any questions.     __________________________________        _____________________________________  Nurse Signature                                          Patient/Designated   Responsible Party Signature  Storm Siegel MD  1/15/2024 1:55:06 AM  This report has been verified and signed electronically.  Dear patient,  As a result of recent federal legislation (The Federal Cures Act), you may   receive lab or pathology results from your procedure in your [facility]   account before your physician is able to contact you. Your physician or   their representative will relay the results to you with their   recommendations at their soonest availability.  Thank you,  PROVATION

## 2024-01-15 NOTE — ANESTHESIA PREPROCEDURE EVALUATION
01/15/2024  Cruz Tran is a 64 y.o., male.    Patient Active Problem List   Diagnosis    Esophageal dysphagia    Obstruction of esophagus due to food impaction      Pre-op Assessment    I have reviewed the Patient Summary Reports.     I have reviewed the Nursing Notes. I have reviewed the NPO Status.   I have reviewed the Medications.     Review of Systems  Anesthesia Hx:             Denies Family Hx of Anesthesia complications.    Denies Personal Hx of Anesthesia complications.                    Social:  Smoker       Hematology/Oncology:  Hematology Normal   Oncology Normal                                   EENT/Dental:  EENT/Dental Normal           Cardiovascular:     Hypertension                                        Pulmonary:  Pulmonary Normal                       Renal/:  Renal/ Normal                 Musculoskeletal:  Musculoskeletal Normal                Neurological:  Neurology Normal                                      Endocrine:  Endocrine Normal          Obesity / BMI > 30  Dermatological:  Skin Normal    Psych:  Psychiatric Normal                  Physical Exam  General: Alert and Cooperative    Airway:  Mallampati: II   Mouth Opening: Normal  TM Distance: Normal  Tongue: Normal  Neck ROM: Normal ROM    Anesthesia Plan  Type of Anesthesia, risks & benefits discussed:    Anesthesia Type: MAC  Intra-op Monitoring Plan: Standard ASA Monitors  Informed Consent: Informed consent signed with the Patient and all parties understand the risks and agree with anesthesia plan.  All questions answered.   ASA Score: 3 Emergent  Day of Surgery Review of History & Physical: I have interviewed and examined the patient. I have reviewed the patient's H&P dated:     Ready For Surgery From Anesthesia Perspective.   .

## 2024-01-15 NOTE — ED PROVIDER NOTES
SCRIBE #1 NOTE: I, Me-Ankit Arnold, am scribing for, and in the presence of, Javi Mishra Jr., MD. I have scribed the entire note.       History     Chief Complaint   Patient presents with    Oral Swelling     Sore throat and difficulty swallowing and SOB today. Hx of esophageal problems      Review of patient's allergies indicates:  No Known Allergies      History of Present Illness     HPI    1/14/2024, 9:42 PM  History obtained from the patient      History of Present Illness: Cruz Tran is a 64 y.o. male patient with a PMHx of HTN who presents to the Emergency Department for evaluation of trouble swallowing which onset 2 days ago. Pt states that he ate chicken 2 days ago and felt his food getting stuck in his throat. Pt has not been able to keep any food, liquid, and his own oral secretions since then. Pt reports a similar experience a couple months ago; pt kept drinking water until symptoms resolved. This time, symptoms persist. Symptoms are constant and moderate in severity. No mitigating or exacerbating factors reported. Associated symptoms include sore throat, vomiting, and SOB. Pt denies fever, CP, and all other sxs at this time. No prior Tx reported. No further complaints or concerns at this time.       Arrival mode: Personal vehicle    PCP: Jovanny Barboza MD        Past Medical History:  Past Medical History:   Diagnosis Date    Hypertension        Past Surgical History:  Past Surgical History:   Procedure Laterality Date    arm surgery      EGD, WITH FOREIGN BODY REMOVAL N/A 1/15/2024    Procedure: EGD, WITH FOREIGN BODY REMOVAL;  Surgeon: Storm Siegel MD;  Location: [facility];  Service: Endoscopy;  Laterality: N/A;    HERNIA REPAIR           Family History:  History reviewed. No pertinent family history.    Social History:  Social History     Tobacco Use    Smoking status: Every Day     Types: Cigarettes    Smokeless tobacco: Never   Substance and Sexual Activity    Alcohol use: Yes  "    Alcohol/week: 6.0 standard drinks of alcohol     Types: 6 Cans of beer per week     Comment: "6 beers a day"    Drug use: Not on file    Sexual activity: Not on file        Review of Systems     Review of Systems   Constitutional:  Negative for fever.   HENT:  Positive for sore throat and trouble swallowing.    Respiratory:  Positive for shortness of breath.    Cardiovascular:  Negative for chest pain.   Gastrointestinal:  Positive for vomiting. Negative for nausea.   Genitourinary:  Negative for dysuria.   Musculoskeletal:  Negative for back pain.   Skin:  Negative for rash.   Neurological:  Negative for weakness.   Hematological:  Does not bruise/bleed easily.   All other systems reviewed and are negative.     Physical Exam     Initial Vitals [01/14/24 1749]   BP Pulse Resp Temp SpO2   (!) 174/89 105 20 98.3 °F (36.8 °C) 98 %      MAP       --          Physical Exam  Nursing Notes and Vital Signs Reviewed.  Constitutional: Patient is in no acute distress. Well-developed and well-nourished.  Head: Atraumatic. Normocephalic.  Eyes: PERRL. EOM intact. Conjunctivae are not pale. No scleral icterus.  ENT: Mucous membranes are moist. Oropharynx is clear and symmetric. Pt unable to manage oral secretion and unable to tolerate PO solids/liquids. Airways intact.  Neck: Supple. Full ROM. No lymphadenopathy.  Cardiovascular: Regular rate. Regular rhythm. No murmurs, rubs, or gallops. Distal pulses are 2+ and symmetric.  Pulmonary/Chest: No respiratory distress. Clear to auscultation bilaterally. No wheezing or rales.  Abdominal: Soft and non-distended.  There is no tenderness.  No rebound, guarding, or rigidity. Good bowel sounds.  Genitourinary: No CVA tenderness  Musculoskeletal: Moves all extremities. No obvious deformities. No edema. No calf tenderness.  Skin: Warm and dry.  Neurological:  Alert, awake, and appropriate.  Normal speech.  No acute focal neurological deficits are appreciated. GCS 15.  Psychiatric: " Normal affect. Good eye contact. Appropriate in content.     ED Course   Procedures  ED Vital Signs:  Vitals:    01/14/24 1749 01/15/24 0117 01/15/24 0151 01/15/24 0202   BP: (!) 174/89 (!) 135/92 126/75 (!) 140/78   Pulse: 105 102 95 91   Resp: 20 20 20 20   Temp: 98.3 °F (36.8 °C) 98 °F (36.7 °C) 98 °F (36.7 °C) 98 °F (36.7 °C)   TempSrc: Oral      SpO2: 98% 97% (!) 93% 96%   Weight: 104.2 kg (229 lb 11.5 oz)          Abnormal Lab Results:  Labs Reviewed   CBC W/ AUTO DIFFERENTIAL - Abnormal; Notable for the following components:       Result Value     (*)     MCH 35.4 (*)     Immature Granulocytes 0.6 (*)     Immature Grans (Abs) 0.06 (*)     All other components within normal limits   COMPREHENSIVE METABOLIC PANEL - Abnormal; Notable for the following components:    Total Protein 8.8 (*)     All other components within normal limits   TROPONIN I        All Lab Results:  Results for orders placed or performed during the hospital encounter of 01/14/24   CBC auto differential   Result Value Ref Range    WBC 9.29 3.90 - 12.70 K/uL    RBC 4.89 4.60 - 6.20 M/uL    Hemoglobin 17.3 14.0 - 18.0 g/dL    Hematocrit 49.6 40.0 - 54.0 %     (H) 82 - 98 fL    MCH 35.4 (H) 27.0 - 31.0 pg    MCHC 34.9 32.0 - 36.0 g/dL    RDW 12.3 11.5 - 14.5 %    Platelets 268 150 - 450 K/uL    MPV 9.2 9.2 - 12.9 fL    Immature Granulocytes 0.6 (H) 0.0 - 0.5 %    Gran # (ANC) 6.4 1.8 - 7.7 K/uL    Immature Grans (Abs) 0.06 (H) 0.00 - 0.04 K/uL    Lymph # 1.7 1.0 - 4.8 K/uL    Mono # 0.7 0.3 - 1.0 K/uL    Eos # 0.4 0.0 - 0.5 K/uL    Baso # 0.08 0.00 - 0.20 K/uL    nRBC 0 0 /100 WBC    Gran % 68.3 38.0 - 73.0 %    Lymph % 18.3 18.0 - 48.0 %    Mono % 7.8 4.0 - 15.0 %    Eosinophil % 4.1 0.0 - 8.0 %    Basophil % 0.9 0.0 - 1.9 %    Differential Method Automated    Comprehensive metabolic panel   Result Value Ref Range    Sodium 141 136 - 145 mmol/L    Potassium 4.2 3.5 - 5.1 mmol/L    Chloride 102 95 - 110 mmol/L    CO2 23 23 - 29  mmol/L    Glucose 94 70 - 110 mg/dL    BUN 16 8 - 23 mg/dL    Creatinine 1.1 0.5 - 1.4 mg/dL    Calcium 9.8 8.7 - 10.5 mg/dL    Total Protein 8.8 (H) 6.0 - 8.4 g/dL    Albumin 4.3 3.5 - 5.2 g/dL    Total Bilirubin 0.8 0.1 - 1.0 mg/dL    Alkaline Phosphatase 92 55 - 135 U/L    AST 29 10 - 40 U/L    ALT 29 10 - 44 U/L    eGFR >60 >60 mL/min/1.73 m^2    Anion Gap 16 8 - 16 mmol/L   Troponin I   Result Value Ref Range    Troponin I <0.006 0.000 - 0.026 ng/mL   Specimen to Pathology, Surgery Gastrointestinal tract   Result Value Ref Range    Final Pathologic Diagnosis       Gastric, endoscopic biopsy:   Minimal chronic inactive gastritis with mild reactive gastropathy, negative for metaplasia, dysplasia or malignancy  Immunostain for Helicobacter pylori is negative with appropriate control.      Gross       Surgery ID:  [id number]    Pathology ID:  [id number]  1.  Received in formalin labeled &quot;gastric biopsies rule out H pylori&quot; is a single tan tissue fragment measuring 0.3 x 0.3 x 0.2 cm.  The specimen is submitted entirely in cassette 1A.    FYA--1-A        Grossed by: Anel Bui MS, PA([facility])      Disclaimer       Unless the case is a 'gross only' or additional testing only, the final diagnosis for each specimen is based on a microscopic examination of appropriate tissue sections.         Imaging Results:  Imaging Results    None              The Emergency Provider reviewed the vital signs and test results, which are outlined above.     ED Discussion     12:06 AM: Discussed pt's case with Dr. Dr. Robbin MD (Gastroenterology) who recommends to set pt up for an EGD and to keep the pt NPO.    2:00 AM: Reassessed pt at this time. Discussed with pt all pertinent ED information and results. Discussed pt dx and plan of tx. Gave pt all f/u and return to the ED instructions. All questions and concerns were addressed at this time. Pt expresses understanding of information and instructions, and is comfortable with  plan to discharge. Pt is stable for discharge.    I discussed with patient and/or family/caretaker that evaluation in the ED does not suggest any emergent or life threatening medical conditions requiring immediate intervention beyond what was provided in the ED, and I believe patient is safe for discharge.  Regardless, an unremarkable evaluation in the ED does not preclude the development or presence of a serious of life threatening condition. As such, patient was instructed to return immediately for any worsening or change in current symptoms.           Medical Decision Making  Risk  Prescription drug management.                ED Medication(s):  Medications   glucagon (human recombinant) injection 1 mg (1 mg Intramuscular Given 1/14/24 1824)   glucagon (human recombinant) injection 1 mg (1 mg Intravenous Given 1/14/24 2217)   LORazepam injection 1 mg (1 mg Intravenous Given 1/14/24 2236)       Discharge Medication List as of 1/15/2024  2:03 AM        START taking these medications    Details   pantoprazole (PROTONIX) 40 MG tablet Take 1 tablet (40 mg total) by mouth once daily., Starting Mon 1/15/2024, Until Tue 1/14/2025, Normal              Follow-up Information       [facility]. Schedule an appointment as soon as possible for a visit in 1 week.    Specialty: Gastroenterology  Contact information:  [address] [place] [address] [phone]  Additional information:  Please park on the [address] side and use the Clinic entrance. Check in on the 4th floor, to the left.             Jovanny Barboza MD. Schedule an appointment as soon as possible for a visit in 1 week.    Specialty: Family Medicine  Contact information:  [address] [address] [address]  [phone]               [facility] - Emergency Dept. .    Specialty: Emergency Medicine  Why: As needed, If symptoms worsen  Contact information:  [address]  [place]  [place] [id number] [phone]             [facility] ENDO Follow up in 8 week(s).    Why: For repeat EGD  Contact information:  [address] [place] [address] Dr. Jessy [place] [address]                               Scribe Attestation:   Scribe #1: I performed the above scribed service and the documentation accurately describes the services I performed. I attest to the accuracy of the note.     Attending:   Physician Attestation Statement for Scribe #1: I, Javi Mishra Jr., MD, personally performed the services described in this documentation, as scribed by Allyn Arnold, in my presence, and it is both accurate and complete.           Clinical Impression       ICD-10-CM ICD-9-CM   1. Food impaction of esophagus, initial encounter  T18.128A 935.1    W44.F3XA    2. Esophageal dysphagia  R13.19 787.29   3. Obstruction of esophagus due to food impaction  T18.128A 530.3    W44.F3XA 935.1       Disposition:   Disposition: Discharged  Condition: Stable         Javi Mishra Jr., MD  01/20/24 0049

## 2024-01-15 NOTE — H&P
[facility] - Emergency Dept.  Gastroenterology  H&P    Patient Name: Cruz Tran  MRN: [id number]  Admission Date: 1/14/2024  Code Status: No Order    Attending Provider: Javi Mishra Jr., MD   Primary Care Physician: Jovanny Barboza MD  Principal Problem:Esophageal dysphagia    Subjective:     History of Present Illness/Reasons for procedure(s):   Patient presents with a 2 day history of dysphagia after eating chicken. Cannot hold liquids or solids down. Patient has a history of intermittent dysphagia. No GI bleeding and no abdominal pain.     Past Medical History:   Diagnosis Date    Hypertension        No current facility-administered medications on file prior to encounter.     Current Outpatient Medications on File Prior to Encounter   Medication Sig Dispense Refill    amLODIPine (NORVASC) 10 MG tablet Take 10 mg by mouth.      aspirin (ECOTRIN) 81 MG EC tablet Take 81 mg by mouth.      diclofenac sodium (VOLTAREN) 1 % Gel Apply 2 g topically 4 (four) times daily. 1 Tube 1    lisinopril 10 MG tablet Take 10 mg by mouth.         Past Surgical History:   Procedure Laterality Date    arm surgery      HERNIA REPAIR         Review of patient's allergies indicates:  No Known Allergies  Family History    None       Tobacco Use    Smoking status: Every Day    Smokeless tobacco: Never   Substance and Sexual Activity    Alcohol use: Yes    Drug use: Not on file    Sexual activity: Not on file     Review of Systems   Constitutional:  Negative for activity change, appetite change, fatigue, fever and unexpected weight change.   HENT:  Negative for congestion, ear pain, hearing loss, mouth sores, trouble swallowing and voice change.    Eyes:  Negative for pain, redness and itching.   Respiratory:  Negative for apnea, cough, choking, chest tightness, shortness of breath and wheezing.    Cardiovascular:  Negative for chest pain, palpitations and leg swelling.   Gastrointestinal:  Positive for nausea and vomiting.  Negative for abdominal distention, abdominal pain, anal bleeding, blood in stool, constipation and diarrhea.   Endocrine: Negative for cold intolerance, heat intolerance and polyphagia.   Genitourinary:  Negative for dysuria, hematuria and urgency.   Musculoskeletal:  Negative for arthralgias, joint swelling and neck pain.   Skin:  Negative for pallor and rash.   Allergic/Immunologic: Negative for environmental allergies and food allergies.   Neurological:  Negative for dizziness, facial asymmetry and light-headedness.   Hematological:  Negative for adenopathy. Does not bruise/bleed easily.   Psychiatric/Behavioral:  Negative for agitation, behavioral problems, confusion and decreased concentration.      Objective:     Vital Signs (Most Recent):  Temp: 98.3 °F (36.8 °C) (01/14/24 1749)  Pulse: 105 (01/14/24 1749)  Resp: 20 (01/14/24 1749)  BP: (!) 174/89 (01/14/24 1749)  SpO2: 98 % (01/14/24 1749) Vital Signs (24h Range):  Temp:  [98.3 °F (36.8 °C)] 98.3 °F (36.8 °C)  Pulse:  [105] 105  Resp:  [20] 20  SpO2:  [98 %] 98 %  BP: (174)/(89) 174/89     Weight: 104.2 kg (229 lb 11.5 oz) (01/14/24 1749)  Body mass index is 32.04 kg/m².    No intake or output data in the 24 hours ending 01/15/24 0106    Lines/Drains/Airways       Peripheral Intravenous Line  Duration                  Peripheral IV - Single Lumen 01/14/24 1825 20 G Right Antecubital <1 day                    Physical Exam  Vitals and nursing note reviewed.   Constitutional:       Appearance: He is well-developed.   HENT:      Head: Normocephalic and atraumatic.   Eyes:      Conjunctiva/sclera: Conjunctivae normal.      Pupils: Pupils are equal, round, and reactive to light.   Neck:      Thyroid: No thyromegaly.      Trachea: No tracheal deviation.   Cardiovascular:      Rate and Rhythm: Normal rate and regular rhythm.   Pulmonary:      Effort: Pulmonary effort is normal.      Breath sounds: Normal breath sounds. No wheezing or rales.   Chest:      Chest  wall: No tenderness.   Abdominal:      General: Bowel sounds are normal. There is no distension.      Palpations: Abdomen is soft. There is no mass.      Tenderness: There is no abdominal tenderness. There is no guarding.   Musculoskeletal:         General: Normal range of motion.      Cervical back: Normal range of motion and neck supple.   Lymphadenopathy:      Cervical: No cervical adenopathy.   Skin:     General: Skin is warm and dry.   Neurological:      Mental Status: He is alert and oriented to person, place, and time.      Cranial Nerves: No cranial nerve deficit.   Psychiatric:         Behavior: Behavior normal.             Latest Ref Rng & Units 10/15/2009     1:11 PM 1/14/2024     6:23 PM   Lab Summary   Hemoglobin 14.0 - 18.0 g/dL 12.4  17.3    Hematocrit 40.0 - 54.0 % 35.6  49.6    White count 3.90 - 12.70 K/uL 6.40  9.29    Platelet count 150 - 450 K/uL 208  268    Sodium 136 - 145 mmol/L 134  141    Potassium 3.5 - 5.1 mmol/L 4.1  4.2    Calcium 8.7 - 10.5 mg/dL 8.2  9.8    Creatinine 0.5 - 1.4 mg/dL 0.8  1.1    AST 10 - 40 U/L (None) 29    Alk Phos 55 - 135 U/L (None) 92    Bilirubin 0.1 - 1.0 mg/dL (None) 0.8    Glucose 70 - 110 mg/dL 68  94    Total protein 6.0 - 8.4 g/dL (None) 8.8    Albumin 3.5 - 5.2 g/dL (None) 4.3         Assessment/Plan:     Active Diagnoses:    Diagnosis Date Noted POA    PRINCIPAL PROBLEM:  Esophageal dysphagia [R13.19] 01/15/2024 Yes    Obstruction of esophagus due to food impaction [T18.128A, W44.F3XA] 01/15/2024 Yes      Problems Resolved During this Admission:       Risks, benefits and alternative options were discussed with the patient. Risks including but not limited to infection, bleeding, heart or respiratory problems and perforation that may require surgery were all explained to the patient. The patient had a chance for questions if there were doubts or concerns about the test. The referring provider will be notified that our consultation is complete and available  through the patient's records.    Thanks for letting us participate in the care of this nice patient,      Storm Siegel MD  Gastroenterology  [facility] - Emergency Dept.

## 2024-01-15 NOTE — ANESTHESIA POSTPROCEDURE EVALUATION
Anesthesia Post Evaluation    Patient: Cruz Tran    Procedure(s) Performed: Procedure(s) (LRB):  EGD, WITH FOREIGN BODY REMOVAL (N/A)    Final Anesthesia Type: MAC      Patient location during evaluation: PACU  Patient participation: Yes- Able to Participate  Level of consciousness: awake  Post-procedure vital signs: reviewed and stable  Pain management: adequate  Airway patency: patent    PONV status at discharge: No PONV  Anesthetic complications: no      Cardiovascular status: blood pressure returned to baseline and hemodynamically stable  Respiratory status: unassisted and spontaneous ventilation  Hydration status: euvolemic  Follow-up not needed.          Vitals Value Taken Time   /78 01/15/24 0202   Temp 36.7 °C (98 °F) 01/15/24 0202   Pulse 91 01/15/24 0202   Resp 20 01/15/24 0202   SpO2 96 % 01/15/24 0202         Event Time   Out of Recovery 01/15/2024 02:16:43         Pain/Nancy Score: Nancy Score: 10 (1/15/2024  2:02 AM)

## 2024-01-15 NOTE — FIRST PROVIDER EVALUATION
Medical screening examination initiated.  I have conducted a focused provider triage encounter, findings are as follows:    Brief history of present illness:  64-year-old male patient presents emergency room with possible foreign body in throat.  Patient states he was eating Friday and felt like something was in his throat and he has not been able to swallow anything since.    Vitals:    01/14/24 1749   BP: (!) 174/89   BP Location: Right arm   Patient Position: Sitting   Pulse: 105   Resp: 20   Temp: 98.3 °F (36.8 °C)   TempSrc: Oral   SpO2: 98%   Weight: 104.2 kg (229 lb 11.5 oz)       Pertinent physical exam:  Speaking full sentences blood pressure slightly elevated    Brief workup plan:  Lab work    Preliminary workup initiated; this workup will be continued and followed by the physician or advanced practice provider that is assigned to the patient when roomed.

## 2024-01-15 NOTE — BRIEF OP NOTE
Endoscopy Discharge Summary      Admit Date: 1/14/2024    Discharge Date and Time:  1/15/2024 2:01 AM    Attending Physician: Arianna Suárez MD     Discharge Physician: No att. providers found     Principal Admitting Diagnoses: Esophageal dysphagia         Discharge Diagnosis: The primary encounter diagnosis was Food impaction of esophagus, initial encounter. Diagnoses of Esophageal dysphagia and Obstruction of esophagus due to food impaction were also pertinent to this visit.     Discharged Condition: Good    Indication for Admission: Esophageal dysphagia     Hospital Course: Patient was admitted for an inpatient procedure and tolerated the procedure well with no complications.    Significant Diagnostic Studies: EGD to remove food bolus and biopsies.     Pathology (if any):  Specimen (24h ago, onward)       Start     Ordered    01/15/24 0158  Specimen to Pathology, Surgery Gastrointestinal tract  Once        Comments: Pre-op Diagnosis: Esophageal obstruction due to food impaction [T18.128A, W44.F3XA]Procedure(s):EGD, WITH FOREIGN BODY REMOVAL 1. Gastric bxs r/o H. Pylori     References:    Click here for ordering Quick Tip   Question Answer Comment   Procedure Type: Gastrointestinal tract    Which provider would you like to cc? ARIANNA SUÁREZ.    Release to patient Immediate        01/15/24 0158                    Disposition: Home.    Bleeding: None    No Implants         Follow Up/Patient Instructions:   Current Discharge Medication List        START taking these medications    Details   pantoprazole (PROTONIX) 40 MG tablet Take 1 tablet (40 mg total) by mouth once daily.  Qty: 30 tablet, Refills: 11           CONTINUE these medications which have NOT CHANGED    Details   amLODIPine (NORVASC) 10 MG tablet Take 10 mg by mouth.      aspirin (ECOTRIN) 81 MG EC tablet Take 81 mg by mouth.      diclofenac sodium (VOLTAREN) 1 % Gel Apply 2 g topically 4 (four) times daily.  Qty: 1 Tube, Refills: 1    Associated  Diagnoses: Left shoulder pain, unspecified chronicity      lisinopril 10 MG tablet Take 10 mg by mouth.             Discharge Procedure Orders   Diet general     No dressing needed     Call MD for:  temperature >100.4     Call MD for:  persistent nausea and vomiting     Call MD for:  severe uncontrolled pain     Call MD for:  difficulty breathing, headache or visual disturbances     Call MD for:  redness, tenderness, or signs of infection (pain, swelling, redness, odor or green/yellow discharge around incision site)     Call MD for:  hives     Call MD for:  persistent dizziness or light-headedness        Follow-up Information       [facility]. Schedule an appointment as soon as possible for a visit in 1 week.    Specialty: Gastroenterology  Contact information:  [id number] [place] [address] [phone]  Additional information:  Please park on the [address] side and use the Clinic entrance. Check in on the 4th floor, to the left.             Jovanny Barboza MD. Schedule an appointment as soon as possible for a visit in 1 week.    Specialty: Family Medicine  Contact information:  [address] [address] [address]  [phone]               [facility] - Emergency Dept. .    Specialty: Emergency Medicine  Why: As needed, If symptoms worsen  Contact information:  [address] [place] [address] [phone]             [facility] ENDO Follow up in 8 week(s).    Why: For repeat EGD  Contact information:  [address] [place] [address]   [facility] [address]

## 2024-01-15 NOTE — PLAN OF CARE
Food bolus removed. Patient waking up. Dr Siegel updating wife on procedure outcome/ recs. In recovery. Will discharge home when meets goals.

## 2024-01-15 NOTE — TRANSFER OF CARE
Anesthesia Transfer of Care Note    Patient: Cruz Tran    Procedure(s) Performed: Procedure(s) (LRB):  EGD, WITH FOREIGN BODY REMOVAL (N/A)    Patient location: PACU    Anesthesia Type: MAC    Transport from OR: Transported from OR on room air with adequate spontaneous ventilation    Post pain: adequate analgesia    Post assessment: no apparent anesthetic complications and tolerated procedure well    Post vital signs: stable    Level of consciousness: sedated    Nausea/Vomiting: no nausea/vomiting    Complications: none    Transfer of care protocol was followed    Last vitals: Visit Vitals  /75   Pulse 95   Temp 36.7 °C (98 °F)   Resp 20   Wt 104.2 kg (229 lb 11.5 oz)   SpO2 (!) 93%   BMI 32.04 kg/m²

## 2024-01-15 NOTE — DISCHARGE INSTRUCTIONS
Return to ER if symptoms persist or worsen.    Dear Cruz Tran      If you develop fevers, severe abdominal pain, significant bleeding, nausea or vomiting, please contact us or come to our Emergency Department at [facility].  Our  contact number is [phone] available for emergencies or any question that you may have.      You may return to normal activities tomorrow.  Written discharge instructions were provided to you today and have them handy since you may not remember our conversation today.       If you take Aspirin, please resume taking it at your prior dose today. If we obtained biopsies or removed polyps during your procedure, we will contact you within 5 to 6 days with the results.      Thanks for trusting us with your healthcare needs.      Sincerely,     Storm Siegel M.D.        To rate your experience with Dr. Siegel, please click on the link below     [email or web address]

## 2024-01-18 LAB
FINAL PATHOLOGIC DIAGNOSIS: NORMAL
GROSS: NORMAL
Lab: NORMAL

## 2024-01-18 NOTE — PROGRESS NOTES
Mr. Cruz Tran,    Biopsies obtained during your procedure are essentially benign/normal. No further action is needed at this point.    Thanks for using [facility], Aldo J. Russo, M.D.

## 2024-01-19 ENCOUNTER — PATIENT MESSAGE (OUTPATIENT)
Dept: GASTROENTEROLOGY | Facility: CLINIC | Age: 64
End: 2024-01-19
Payer: MEDICARE

## 2024-10-07 RX ORDER — PANTOPRAZOLE SODIUM 40 MG/1
40 TABLET, DELAYED RELEASE ORAL
Qty: 90 TABLET | Refills: 3 | Status: SHIPPED | OUTPATIENT
Start: 2024-10-07